# Patient Record
Sex: MALE | Race: WHITE | NOT HISPANIC OR LATINO | Employment: PART TIME | URBAN - NONMETROPOLITAN AREA
[De-identification: names, ages, dates, MRNs, and addresses within clinical notes are randomized per-mention and may not be internally consistent; named-entity substitution may affect disease eponyms.]

---
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
## 2021-06-12 ENCOUNTER — HOSPITAL ENCOUNTER (EMERGENCY)
Facility: HOSPITAL | Age: 21
Discharge: HOME | End: 2021-06-12
Attending: EMERGENCY MEDICINE | Admitting: EMERGENCY MEDICINE

## 2021-06-12 ENCOUNTER — APPOINTMENT (OUTPATIENT)
Dept: CT IMAGING | Facility: HOSPITAL | Age: 21
End: 2021-06-12

## 2021-06-12 VITALS
BODY MASS INDEX: 37.95 KG/M2 | WEIGHT: 280.2 LBS | HEART RATE: 85 BPM | OXYGEN SATURATION: 98 % | RESPIRATION RATE: 16 BRPM | HEIGHT: 72 IN | SYSTOLIC BLOOD PRESSURE: 137 MMHG | DIASTOLIC BLOOD PRESSURE: 85 MMHG | TEMPERATURE: 98.7 F

## 2021-06-12 DIAGNOSIS — K62.5 RECTAL BLEEDING: Primary | ICD-10-CM

## 2021-06-12 LAB
ALBUMIN SERPL-MCNC: 4.5 G/DL (ref 3.5–5.2)
ALBUMIN/GLOB SERPL: 1.7 G/DL
ALP SERPL-CCNC: 92 U/L (ref 39–117)
ALT SERPL W P-5'-P-CCNC: 33 U/L (ref 1–41)
ANION GAP SERPL CALCULATED.3IONS-SCNC: 10 MMOL/L (ref 5–15)
AST SERPL-CCNC: 16 U/L (ref 1–40)
BASOPHILS # BLD AUTO: 0.07 10*3/MM3 (ref 0–0.2)
BASOPHILS NFR BLD AUTO: 0.8 % (ref 0–1.5)
BILIRUB SERPL-MCNC: 0.2 MG/DL (ref 0–1.2)
BUN SERPL-MCNC: 14 MG/DL (ref 6–20)
BUN/CREAT SERPL: 15.4 (ref 7–25)
CALCIUM SPEC-SCNC: 9.2 MG/DL (ref 8.6–10.5)
CHLORIDE SERPL-SCNC: 102 MMOL/L (ref 98–107)
CO2 SERPL-SCNC: 25 MMOL/L (ref 22–29)
CREAT SERPL-MCNC: 0.91 MG/DL (ref 0.76–1.27)
DEPRECATED RDW RBC AUTO: 39.9 FL (ref 37–54)
EOSINOPHIL # BLD AUTO: 0.21 10*3/MM3 (ref 0–0.4)
EOSINOPHIL NFR BLD AUTO: 2.3 % (ref 0.3–6.2)
ERYTHROCYTE [DISTWIDTH] IN BLOOD BY AUTOMATED COUNT: 12 % (ref 12.3–15.4)
GFR SERPL CREATININE-BSD FRML MDRD: 105 ML/MIN/1.73
GLOBULIN UR ELPH-MCNC: 2.6 GM/DL
GLUCOSE SERPL-MCNC: 108 MG/DL (ref 65–99)
HCT VFR BLD AUTO: 43 % (ref 37.5–51)
HGB BLD-MCNC: 14.7 G/DL (ref 13–17.7)
IMM GRANULOCYTES # BLD AUTO: 0.02 10*3/MM3 (ref 0–0.05)
IMM GRANULOCYTES NFR BLD AUTO: 0.2 % (ref 0–0.5)
LIPASE SERPL-CCNC: 32 U/L (ref 13–60)
LYMPHOCYTES # BLD AUTO: 2.46 10*3/MM3 (ref 0.7–3.1)
LYMPHOCYTES NFR BLD AUTO: 27.5 % (ref 19.6–45.3)
MCH RBC QN AUTO: 30.9 PG (ref 26.6–33)
MCHC RBC AUTO-ENTMCNC: 34.2 G/DL (ref 31.5–35.7)
MCV RBC AUTO: 90.3 FL (ref 79–97)
MONOCYTES # BLD AUTO: 0.73 10*3/MM3 (ref 0.1–0.9)
MONOCYTES NFR BLD AUTO: 8.2 % (ref 5–12)
NEUTROPHILS NFR BLD AUTO: 5.45 10*3/MM3 (ref 1.7–7)
NEUTROPHILS NFR BLD AUTO: 61 % (ref 42.7–76)
NRBC BLD AUTO-RTO: 0 /100 WBC (ref 0–0.2)
PLATELET # BLD AUTO: 261 10*3/MM3 (ref 140–450)
PMV BLD AUTO: 10.1 FL (ref 6–12)
POTASSIUM SERPL-SCNC: 3.8 MMOL/L (ref 3.5–5.2)
PROT SERPL-MCNC: 7.1 G/DL (ref 6–8.5)
RBC # BLD AUTO: 4.76 10*6/MM3 (ref 4.14–5.8)
SODIUM SERPL-SCNC: 137 MMOL/L (ref 136–145)
WBC # BLD AUTO: 8.94 10*3/MM3 (ref 3.4–10.8)

## 2021-06-12 PROCEDURE — 74177 CT ABD & PELVIS W/CONTRAST: CPT

## 2021-06-12 PROCEDURE — 25010000002 IOPAMIDOL 61 % SOLUTION: Performed by: EMERGENCY MEDICINE

## 2021-06-12 PROCEDURE — 80053 COMPREHEN METABOLIC PANEL: CPT | Performed by: NURSE PRACTITIONER

## 2021-06-12 PROCEDURE — 85025 COMPLETE CBC W/AUTO DIFF WBC: CPT | Performed by: NURSE PRACTITIONER

## 2021-06-12 PROCEDURE — 83690 ASSAY OF LIPASE: CPT | Performed by: NURSE PRACTITIONER

## 2021-06-12 PROCEDURE — 99283 EMERGENCY DEPT VISIT LOW MDM: CPT

## 2021-06-12 RX ORDER — SODIUM CHLORIDE 0.9 % (FLUSH) 0.9 %
10 SYRINGE (ML) INJECTION AS NEEDED
Status: DISCONTINUED | OUTPATIENT
Start: 2021-06-12 | End: 2021-06-12 | Stop reason: HOSPADM

## 2021-06-12 RX ADMIN — IOPAMIDOL 100 ML: 612 INJECTION, SOLUTION INTRAVENOUS at 19:24

## 2021-06-12 RX ADMIN — SODIUM CHLORIDE 1000 ML: 9 INJECTION, SOLUTION INTRAVENOUS at 18:52

## 2021-06-12 NOTE — ED PROVIDER NOTES
Subjective   History of Present Illness  Is a 21-year-old male who comes in today complaining of bright red rectal bleeding after a bowel movement yesterday.  He states that he continued to have bleeding from his rectum throughout the night last night and slowed down this morning.  He denies any pain but did have some mild cramping.  He denies any burning or itching.  Review of Systems   Constitutional: Negative.    HENT: Negative.    Eyes: Negative.    Respiratory: Negative.    Cardiovascular: Negative.    Gastrointestinal: Positive for anal bleeding.   Genitourinary: Negative.    Skin: Negative.    Neurological: Negative.    Psychiatric/Behavioral: Negative.        Past Medical History:   Diagnosis Date   • Hypoglycemia    • Injury of back     mid to lower back injury   • Lumbosacral disc herniation    • Pneumonia        Allergies   Allergen Reactions   • Bee Venom Anaphylaxis   • Shellfish-Derived Products Anaphylaxis   • Wasp Venom Anaphylaxis       Past Surgical History:   Procedure Laterality Date   • BACK SURGERY     • CHOLECYSTECTOMY     • DENTAL PROCEDURE     • TONSILLECTOMY AND ADENOIDECTOMY         Family History   Problem Relation Age of Onset   • Cancer Father    • No Known Problems Mother        Social History     Socioeconomic History   • Marital status: Single     Spouse name: Not on file   • Number of children: Not on file   • Years of education: Not on file   • Highest education level: Not on file   Tobacco Use   • Smoking status: Never Smoker   • Smokeless tobacco: Never Used   Substance and Sexual Activity   • Alcohol use: No   • Drug use: No   • Sexual activity: Defer           Objective   Physical Exam  Vitals and nursing note reviewed.   Constitutional:       Appearance: Normal appearance. He is obese.   Neurological:      Mental Status: He is alert.     GEN: No acute distress  Head: Normocephalic, atraumatic  Eyes: Pupils equal round reactive to light  ENT: Posterior pharynx normal in  appearance, oral mucosa is moist  Chest: Nontender to palpation  Cardiovascular: Regular rate  Lungs: Clear to auscultation bilaterally  Abdomen: Soft, nontender, nondistended, no peritoneal signs  Rectal exam reveals bright red blood around the anus, no tearing or fissure noted.  No hemorrhoids noted.  Nontender rectal exam  Extremities: No edema, normal appearance  Neuro: GCS 15  Psych: Mood and affect are appropriate      Procedures           ED Course  ED Course as of Jun 12 2047   Sat Jun 12, 2021 2046 I have discussed the findings with the patient. I have advised him to follow up with GI. I have given him strict return to care instructions. He is agreeable to this plan of care.     [TW]      ED Course User Index  [TW] Hiral Emmanuel, APRN                                           MDM  Number of Diagnoses or Management Options     Amount and/or Complexity of Data Reviewed  Clinical lab tests: ordered and reviewed  Tests in the radiology section of CPT®: ordered and reviewed  Review and summarize past medical records: yes  Discuss the patient with other providers: yes    Risk of Complications, Morbidity, and/or Mortality  Presenting problems: moderate  Diagnostic procedures: moderate  Management options: moderate        Final diagnoses:   Rectal bleeding       ED Disposition  ED Disposition     ED Disposition Condition Comment    Discharge Stable           Charu Saucedo, DO  [address] [place] [address]  [phone]          Beatriz Rajput MD  [address] [facility] [address]  [facility] [address]  [facility] [address] [phone]    Schedule an appointment as soon as possible for a visit            Medication List      No changes were made to your prescriptions during this visit.          Hiral Emmanuel, RD  06/12/21 2047

## 2021-06-15 ENCOUNTER — OFFICE VISIT (OUTPATIENT)
Dept: GASTROENTEROLOGY | Facility: CLINIC | Age: 21
End: 2021-06-15

## 2021-06-15 VITALS
BODY MASS INDEX: 38.19 KG/M2 | WEIGHT: 282 LBS | HEIGHT: 72 IN | SYSTOLIC BLOOD PRESSURE: 138 MMHG | DIASTOLIC BLOOD PRESSURE: 77 MMHG | HEART RATE: 79 BPM | TEMPERATURE: 98.2 F | RESPIRATION RATE: 20 BRPM

## 2021-06-15 DIAGNOSIS — K64.8 INTERNAL HEMORRHOIDS: ICD-10-CM

## 2021-06-15 DIAGNOSIS — K92.1 HEMATOCHEZIA: Primary | ICD-10-CM

## 2021-06-15 PROCEDURE — 99203 OFFICE O/P NEW LOW 30 MIN: CPT | Performed by: PHYSICIAN ASSISTANT

## 2021-06-15 RX ORDER — HYDROCORTISONE 25 MG/G
CREAM TOPICAL 2 TIMES DAILY
Qty: 28.35 G | Refills: 2 | Status: SHIPPED | OUTPATIENT
Start: 2021-06-15 | End: 2021-07-27

## 2021-06-15 NOTE — PROGRESS NOTES
New Patient Consult      Date: 06/15/2021   Patient Name: Chary Boykin  MRN: [id number]  : 2000     Primary Care Provider: Charu Saucedo DO  Referring Provider: Maureen    Chief Complaint   Patient presents with   • Rectal Bleeding     History of Present Illness: Chary Boykin is a 21 y.o. male who is here today as a new patient with Gastroenterology for evaluation of rectal bleeding.     Rectal bleeding started 4 days ago and lasted 2 days in duration. He noticed large amount of bright red rectal bleeding into the toilet without any bowel movement with it (showed picture of toilet). Bleeding came first, then his bowel movement which was soft and formed, not hard or loose. He still had bleeding later which was dripping into his clothes without any stool. Denies any abdominal pain, nausea or vomiting associated with the bleeding nor any symptoms present now. He did go to the [facility] ED for evaluation of rectal bleeding but states that nothing was found. He had rectal exam there saying no hemorrhoids seen. He received fluids in the ED. Has not had any rectal pain at all. He has not seen any protrusions from the rectum. No swelling. He has not tried any otc remedies yet, no creams or ointments. Bleeding was not present yesterday or today.     His bowel habits are described as soft, normal and occurring daily without straining. He has never had a colonoscopy. There is no known family history of colon cancer or colon polyps. He has gained 15 lbs recently, no weight loss, no change in appetite. He is very active, working as an EMT. He does not do much heavy lifting. No recent dietary changes. He drinks 4-5 glasses of water daily, reports diet is ok in fiber. Does admit to drinking 1-2 cokes per day and 3 energy drinks per week.     Subjective      Past Medical History:   Diagnosis Date   • Hypoglycemia    • Injury of back     mid to lower back injury   • Lumbosacral disc herniation     • Pneumonia      Past Surgical History:   Procedure Laterality Date   • BACK SURGERY      micro-discectomy   • CHOLECYSTECTOMY      no stones   • TONSILLECTOMY AND ADENOIDECTOMY     • WISDOM TOOTH EXTRACTION       Family History   Problem Relation Age of Onset   • Cancer Father    • No Known Problems Mother    • Colon cancer Neg Hx    • Cirrhosis Neg Hx    • Liver cancer Neg Hx    • Liver disease Neg Hx      Social History     Socioeconomic History   • Marital status: Single     Spouse name: Not on file   • Number of children: Not on file   • Years of education: Not on file   • Highest education level: Not on file   Tobacco Use   • Smoking status: Never Smoker   • Smokeless tobacco: Never Used   Vaping Use   • Vaping Use: Never used   Substance and Sexual Activity   • Alcohol use: Not Currently     Comment: social   • Drug use: Never   • Sexual activity: Defer       Current Outpatient Medications:   •  EPIPEN 2-SOHAIL 0.3 MG/0.3ML solution auto-injector injection, ADM 0.3 MG IM 1 TIME PRF ANAPHYLAXIS, Disp: , Rfl: 0    Allergies   Allergen Reactions   • Bee Venom Anaphylaxis   • Shellfish-Derived Products Anaphylaxis   • Wasp Venom Anaphylaxis     The following portions of the patient's history were reviewed and updated as appropriate: allergies, current medications, past family history, past medical history, past social history, past surgical history and problem list.    Objective     Physical Exam  Vitals reviewed.   Constitutional:       General: He is not in acute distress.     Appearance: Normal appearance. He is well-developed. He is not ill-appearing or diaphoretic.   HENT:      Head: Normocephalic and atraumatic.      Right Ear: External ear normal.      Left Ear: External ear normal.      Nose: Nose normal.      Mouth/Throat:      Comments: Wearing a mask  Eyes:      General: No scleral icterus.        Right eye: No discharge.         Left eye: No discharge.      Conjunctiva/sclera: Conjunctivae normal.  "  Neck:      Vascular: No JVD.   Cardiovascular:      Rate and Rhythm: Normal rate and regular rhythm.      Heart sounds: Normal heart sounds. No murmur heard.   No friction rub. No gallop.    Pulmonary:      Effort: Pulmonary effort is normal. No respiratory distress.      Breath sounds: Normal breath sounds. No wheezing or rales.   Chest:      Chest wall: No tenderness.   Abdominal:      General: Bowel sounds are normal. There is no distension.      Palpations: Abdomen is soft. There is no mass.      Tenderness: There is no abdominal tenderness. There is no guarding.   Musculoskeletal:         General: No deformity. Normal range of motion.      Cervical back: Normal range of motion.   Skin:     General: Skin is warm and dry.      Findings: No erythema or rash.   Neurological:      Mental Status: He is alert and oriented to person, place, and time.      Coordination: Coordination normal.   Psychiatric:         Mood and Affect: Mood normal.         Behavior: Behavior normal.         Thought Content: Thought content normal.         Judgment: Judgment normal.         Vital Signs:   Vitals:    06/15/21 1331   BP: 138/77   Pulse: 79   Resp: 20   Temp: 98.2 °F (36.8 °C)   TempSrc: Comment: infrared   Weight: 128 kg (282 lb)   Height: 182.9 cm (72\")     Results Review:   I have reviewed the patient's new clinical and imaging results.    Admission on 06/12/2021, Discharged on 06/12/2021   Component Date Value Ref Range Status   • Glucose 06/12/2021 108* 65 - 99 mg/dL Final   • BUN 06/12/2021 14  6 - 20 mg/dL Final   • Creatinine 06/12/2021 0.91  0.76 - 1.27 mg/dL Final   • Sodium 06/12/2021 137  136 - 145 mmol/L Final   • Potassium 06/12/2021 3.8  3.5 - 5.2 mmol/L Final   • Chloride 06/12/2021 102  98 - 107 mmol/L Final   • CO2 06/12/2021 25.0  22.0 - 29.0 mmol/L Final   • Calcium 06/12/2021 9.2  8.6 - 10.5 mg/dL Final   • Total Protein 06/12/2021 7.1  6.0 - 8.5 g/dL Final   • Albumin 06/12/2021 4.50  3.50 - 5.20 g/dL " Final   • ALT (SGPT) 06/12/2021 33  1 - 41 U/L Final   • AST (SGOT) 06/12/2021 16  1 - 40 U/L Final   • Alkaline Phosphatase 06/12/2021 92  39 - 117 U/L Final   • Total Bilirubin 06/12/2021 0.2  0.0 - 1.2 mg/dL Final   • eGFR Non African Amer 06/12/2021 105  >60 mL/min/1.73 Final   • Globulin 06/12/2021 2.6  gm/dL Final   • A/G Ratio 06/12/2021 1.7  g/dL Final   • BUN/Creatinine Ratio 06/12/2021 15.4  7.0 - 25.0 Final   • Anion Gap 06/12/2021 10.0  5.0 - 15.0 mmol/L Final   • Lipase 06/12/2021 32  13 - 60 U/L Final   • WBC 06/12/2021 8.94  3.40 - 10.80 10*3/mm3 Final   • RBC 06/12/2021 4.76  4.14 - 5.80 10*6/mm3 Final   • Hemoglobin 06/12/2021 14.7  13.0 - 17.7 g/dL Final   • Hematocrit 06/12/2021 43.0  37.5 - 51.0 % Final   • MCV 06/12/2021 90.3  79.0 - 97.0 fL Final   • MCH 06/12/2021 30.9  26.6 - 33.0 pg Final   • MCHC 06/12/2021 34.2  31.5 - 35.7 g/dL Final   • RDW 06/12/2021 12.0* 12.3 - 15.4 % Final   • RDW-SD 06/12/2021 39.9  37.0 - 54.0 fl Final   • MPV 06/12/2021 10.1  6.0 - 12.0 fL Final   • Platelets 06/12/2021 261  140 - 450 10*3/mm3 Final   • Neutrophil % 06/12/2021 61.0  42.7 - 76.0 % Final   • Lymphocyte % 06/12/2021 27.5  19.6 - 45.3 % Final   • Monocyte % 06/12/2021 8.2  5.0 - 12.0 % Final   • Eosinophil % 06/12/2021 2.3  0.3 - 6.2 % Final   • Basophil % 06/12/2021 0.8  0.0 - 1.5 % Final   • Immature Grans % 06/12/2021 0.2  0.0 - 0.5 % Final   • Neutrophils, Absolute 06/12/2021 5.45  1.70 - 7.00 10*3/mm3 Final   • Lymphocytes, Absolute 06/12/2021 2.46  0.70 - 3.10 10*3/mm3 Final   • Monocytes, Absolute 06/12/2021 0.73  0.10 - 0.90 10*3/mm3 Final   • Eosinophils, Absolute 06/12/2021 0.21  0.00 - 0.40 10*3/mm3 Final   • Basophils, Absolute 06/12/2021 0.07  0.00 - 0.20 10*3/mm3 Final   • Immature Grans, Absolute 06/12/2021 0.02  0.00 - 0.05 10*3/mm3 Final   • nRBC 06/12/2021 0.0  0.0 - 0.2 /100 WBC Final      CT Abdomen Pelvis With Contrast  Result Date: 6/12/2021  No acute findings in the abdomen or  pelvis to account for patient's symptoms.     Assessment / Plan      1. Hematochezia  2. Internal hemorrhoids  6/15/2021  His bright red rectal bleeding lasted 2 days in duration. It occurred without any bowel movement, without straining and without any abdominal or rectal pain. The bleeding continued to drip onto his clothes afterward. It resolved on it's own without any treatment and has not been present for 2 days now. He does not currently have any GI complaints. He has no family history of any GI disease or cancer. He has daily stools without constipation or diarrhea or straining. This bleeding most likely related to internal hemorrhoids. I have asked him to use anusol twice daily for the next 7 days, then only as needed if rectal bleeding reoccurs. He was instructed to increase dietary fiber intake to 25-45g daily and a list of fiber foods was given. He has agreed to try to increase daily water intake and daily exercise as well. He should cut back on caffeine.   CBC normal from recent ED visit as well as CTAP with contrast, reviewed.    If rectal bleeding occurs again, he will call the office and colonoscopy for evaluation will be considered. Without colonoscopy, colon pathology cannot be ruled out, he understands and agrees with this plan.  - Hydrocortisone, Perianal, (ANUSOL-HC) 2.5 % rectal cream; Insert  into the rectum 2 (Two) Times a Day.  Dispense: 28.35 g; Refill: 2      Follow Up:   Return in about 6 weeks (around 7/27/2021) for recheck rectal bleeding.      Felicia Wakefield PA-C  [facility] [place]  6/15/2021  17:05 EDT    Please note that portions of this note may have been completed with a voice recognition program. Efforts were made to edit the dictations, but occasionally words are mistranscribed.

## 2021-06-15 NOTE — PATIENT INSTRUCTIONS
Cut back on caffeine, drink 100 oz water daily    Fiber Foods  It is recommended that you consume 25-45 grams daily.    Fresh & Dried Fruit  Serving Size Fiber (g)    Apples with skin  1 medium 5.0    Apricot  3 medium 1.0    Apricots, dried  4 pieces 2.9    Banana  1 medium 3.9    Blueberries  1 cup 4.2    Cantaloupe, cubes  1 cup 1.3    Figs, dried  2 medium 3.7    Grapefruit  1/2 medium 3.1    Orange, navel  1 medium 3.4    Peach  1 medium 2.0    Peaches, dried  3 pieces 3.2    Pear  1 medium 5.1    Plum  1 medium 1.1    Raisins  1.5 oz box 1.6    Raspberries  1 cup 6.4    Strawberries  1 cup 4.4      Grains, Beans, Nuts & Seeds  Serving Size Fiber (g)    Almonds  1 oz 4.2    Black beans, cooked  1 cup 13.9    Bran cereal  1 cup 19.9    Bread, whole wheat  1 slice 2.0    Brown rice, dry  1 cup 7.9    Cashews  1 oz 1.0    Flax seeds  3 Tbsp. 6.9    Garbanzo beans, cooked  1 cup 5.8    Kidney beans, cooked  1 cup 11.6    Lentils, red cooked  1 cup 13.6    Lima beans, cooked  1 cup 8.6    Oats, rolled dry  1 cup 12.0    Quinoa (seeds) dry  1/4 cup 6.2    Quinoa, cooked  1 cup 8.4    Pasta, whole wheat  1 cup 6.3    Peanuts  1 oz 2.3    Pistachio nuts  1 oz 3.1    Pumpkin seeds  1/4 cup 4.1    Soybeans, cooked  1 cup 8.6    Sunflower seeds  1/4 cup 3.0    Walnuts  1 oz 3.1            Vegetables  Serving Size Fiber (g)    Avocado (fruit)  1 medium 11.8    Beets, cooked  1 cup 2.8    Beet greens  1 cup 4.2    Bok aleyda, cooked  1 cup 2.8    Broccoli, cooked  1 cup 4.5    [place] sprouts, cooked  1 cup 3.6    Cabbage, cooked  1 cup 4.2    Carrot  1 medium 2.6    Carrot, cooked  1 cup 5.2    Cauliflower, cooked  1 cup 3.4    Manuel slaw  1 cup 4.0    [place]aja greens, cooked  1 cup 2.6    Corn, sweet  1 cup 4.6    Green beans  1 cup 4.0    Celery  1 stalk 1.1    Kale, cooked  1 cup 7.2    Onions, raw  1 cup 2.9    Peas, cooked  1 cup 8.8    Peppers, sweet  1 cup 2.6    Pop corn, air-popped  3 cups 3.6    Potato, baked w/  skin  1 medium 4.8    Spinach, cooked  1 cup 4.3    Summer squash, cooked  1 cup 2.5    Sweet potato, cooked  1 medium 4.9    Swiss chard, cooked  1 cup 3.7    Tomato  1 medium 1.0    Winter squash, cooked  1 cup 6.2    Zucchini, cooked  1 cup 2.6

## 2021-06-16 ENCOUNTER — LAB (OUTPATIENT)
Dept: LAB | Facility: HOSPITAL | Age: 21
End: 2021-06-16

## 2021-07-27 ENCOUNTER — OFFICE VISIT (OUTPATIENT)
Dept: GASTROENTEROLOGY | Facility: CLINIC | Age: 21
End: 2021-07-27

## 2021-07-27 VITALS
SYSTOLIC BLOOD PRESSURE: 142 MMHG | WEIGHT: 284 LBS | RESPIRATION RATE: 20 BRPM | HEIGHT: 72 IN | DIASTOLIC BLOOD PRESSURE: 79 MMHG | BODY MASS INDEX: 38.47 KG/M2 | TEMPERATURE: 99.3 F | HEART RATE: 85 BPM

## 2021-07-27 DIAGNOSIS — E66.9 CLASS 2 OBESITY WITH BODY MASS INDEX (BMI) OF 38.0 TO 38.9 IN ADULT, UNSPECIFIED OBESITY TYPE, UNSPECIFIED WHETHER SERIOUS COMORBIDITY PRESENT: ICD-10-CM

## 2021-07-27 DIAGNOSIS — K92.1 HEMATOCHEZIA: Primary | ICD-10-CM

## 2021-07-27 PROCEDURE — 99213 OFFICE O/P EST LOW 20 MIN: CPT | Performed by: PHYSICIAN ASSISTANT

## 2021-07-27 NOTE — PROGRESS NOTES
Follow Up Note     Date: 2021   Patient Name: Chary Boykin  MRN: [id number]  : 2000     Primary Care Provider: Charu Saucedo DO     Chief Complaint   Patient presents with   • Follow-up   • Hematochezia   • Hemorrhoids     History of present illness:   2021  Chary Boykin is a 21 y.o. male who is here today for follow up regarding Hematochezia, and Hemorrhoids.    He is feeling well today without complaints. No more rectal bleeding since incident prior to initial visit. He has been under a lot of stress recently with a new job and moving and has gained almost 60 lbs in the past 3 months. He denies any current abdominal pain, nausea, vomiting, GERD.     Interval History:  6/15/2021  Rectal bleeding started 4 days ago and lasted 2 days in duration. He noticed large amount of bright red rectal bleeding into the toilet without any bowel movement with it (showed picture of toilet). Bleeding came first, then his bowel movement which was soft and formed, not hard or loose. He still had bleeding later which was dripping into his clothes without any stool. Denies any abdominal pain, nausea or vomiting associated with the bleeding nor any symptoms present now. He did go to the [facility] ED for evaluation of rectal bleeding but states that nothing was found. He had rectal exam there saying no hemorrhoids seen. He received fluids in the ED. Has not had any rectal pain at all. He has not seen any protrusions from the rectum. No swelling. He has not tried any otc remedies yet, no creams or ointments. Bleeding was not present yesterday or today.      His bowel habits are described as soft, normal and occurring daily without straining. He has never had a colonoscopy. There is no known family history of colon cancer or colon polyps. He has gained 15 lbs recently, no weight loss, no change in appetite. He is very active, working as an EMT. He does not do much heavy lifting. No recent  dietary changes. He drinks 4-5 glasses of water daily, reports diet is ok in fiber. Does admit to drinking 1-2 cokes per day and 3 energy drinks per week.     Subjective     Past Medical History:   Diagnosis Date   • Hypoglycemia    • Injury of back     mid to lower back injury   • Lumbosacral disc herniation    • Pneumonia      Past Surgical History:   Procedure Laterality Date   • BACK SURGERY      micro-discectomy   • CHOLECYSTECTOMY      no stones   • TONSILLECTOMY AND ADENOIDECTOMY     • WISDOM TOOTH EXTRACTION       Family History   Problem Relation Age of Onset   • Cancer Father    • No Known Problems Mother    • Colon cancer Neg Hx    • Cirrhosis Neg Hx    • Liver cancer Neg Hx    • Liver disease Neg Hx      Social History     Socioeconomic History   • Marital status: Single     Spouse name: Not on file   • Number of children: Not on file   • Years of education: Not on file   • Highest education level: Not on file   Tobacco Use   • Smoking status: Never Smoker   • Smokeless tobacco: Never Used   Vaping Use   • Vaping Use: Never used   Substance and Sexual Activity   • Alcohol use: Not Currently     Comment: social   • Drug use: Never   • Sexual activity: Defer     Current Outpatient Medications:   •  EPIPEN 2-SOHAIL 0.3 MG/0.3ML solution auto-injector injection, ADM 0.3 MG IM 1 TIME PRF ANAPHYLAXIS, Disp: , Rfl: 0    Allergies   Allergen Reactions   • Bee Venom Anaphylaxis   • Shellfish-Derived Products Anaphylaxis   • Wasp Venom Anaphylaxis     The following portions of the patient's history were reviewed and updated as appropriate: allergies, current medications, past family history, past medical history, past social history, past surgical history and problem list.    Objective     Physical Exam  Constitutional:       General: He is not in acute distress.     Appearance: Normal appearance. He is well-developed. He is obese. He is not diaphoretic.   HENT:      Head: Normocephalic and atraumatic.      Right  "Ear: External ear normal.      Left Ear: External ear normal.      Nose: Nose normal.      Mouth/Throat:      Comments: Wearing a mask  Eyes:      General: No scleral icterus.        Right eye: No discharge.         Left eye: No discharge.      Conjunctiva/sclera: Conjunctivae normal.   Neck:      Trachea: No tracheal deviation.   Pulmonary:      Effort: Pulmonary effort is normal. No respiratory distress.   Musculoskeletal:         General: Normal range of motion.      Cervical back: Normal range of motion.   Skin:     Coloration: Skin is not pale.      Findings: No erythema or rash.   Neurological:      Mental Status: He is alert and oriented to person, place, and time.      Coordination: Coordination normal.   Psychiatric:         Mood and Affect: Mood normal.         Behavior: Behavior normal.         Thought Content: Thought content normal.         Judgment: Judgment normal.       Vitals:    07/27/21 1428   BP: 142/79   Pulse: 85   Resp: 20   Temp: 99.3 °F (37.4 °C)   TempSrc: Infrared   Weight: 129 kg (284 lb)   Height: 182.9 cm (72\")       Results Review:   I reviewed the patient's new clinical results.    Admission on 06/12/2021, Discharged on 06/12/2021   Component Date Value Ref Range Status   • Glucose 06/12/2021 108* 65 - 99 mg/dL Final   • BUN 06/12/2021 14  6 - 20 mg/dL Final   • Creatinine 06/12/2021 0.91  0.76 - 1.27 mg/dL Final   • Sodium 06/12/2021 137  136 - 145 mmol/L Final   • Potassium 06/12/2021 3.8  3.5 - 5.2 mmol/L Final   • Chloride 06/12/2021 102  98 - 107 mmol/L Final   • CO2 06/12/2021 25.0  22.0 - 29.0 mmol/L Final   • Calcium 06/12/2021 9.2  8.6 - 10.5 mg/dL Final   • Total Protein 06/12/2021 7.1  6.0 - 8.5 g/dL Final   • Albumin 06/12/2021 4.50  3.50 - 5.20 g/dL Final   • ALT (SGPT) 06/12/2021 33  1 - 41 U/L Final   • AST (SGOT) 06/12/2021 16  1 - 40 U/L Final   • Alkaline Phosphatase 06/12/2021 92  39 - 117 U/L Final   • Total Bilirubin 06/12/2021 0.2  0.0 - 1.2 mg/dL Final   • eGFR " Non  Amer 06/12/2021 105  >60 mL/min/1.73 Final   • Globulin 06/12/2021 2.6  gm/dL Final   • A/G Ratio 06/12/2021 1.7  g/dL Final   • BUN/Creatinine Ratio 06/12/2021 15.4  7.0 - 25.0 Final   • Anion Gap 06/12/2021 10.0  5.0 - 15.0 mmol/L Final   • Lipase 06/12/2021 32  13 - 60 U/L Final   • WBC 06/12/2021 8.94  3.40 - 10.80 10*3/mm3 Final   • RBC 06/12/2021 4.76  4.14 - 5.80 10*6/mm3 Final   • Hemoglobin 06/12/2021 14.7  13.0 - 17.7 g/dL Final   • Hematocrit 06/12/2021 43.0  37.5 - 51.0 % Final   • MCV 06/12/2021 90.3  79.0 - 97.0 fL Final   • MCH 06/12/2021 30.9  26.6 - 33.0 pg Final   • MCHC 06/12/2021 34.2  31.5 - 35.7 g/dL Final   • RDW 06/12/2021 12.0* 12.3 - 15.4 % Final   • RDW-SD 06/12/2021 39.9  37.0 - 54.0 fl Final   • MPV 06/12/2021 10.1  6.0 - 12.0 fL Final   • Platelets 06/12/2021 261  140 - 450 10*3/mm3 Final   • Neutrophil % 06/12/2021 61.0  42.7 - 76.0 % Final   • Lymphocyte % 06/12/2021 27.5  19.6 - 45.3 % Final   • Monocyte % 06/12/2021 8.2  5.0 - 12.0 % Final   • Eosinophil % 06/12/2021 2.3  0.3 - 6.2 % Final   • Basophil % 06/12/2021 0.8  0.0 - 1.5 % Final   • Immature Grans % 06/12/2021 0.2  0.0 - 0.5 % Final   • Neutrophils, Absolute 06/12/2021 5.45  1.70 - 7.00 10*3/mm3 Final   • Lymphocytes, Absolute 06/12/2021 2.46  0.70 - 3.10 10*3/mm3 Final   • Monocytes, Absolute 06/12/2021 0.73  0.10 - 0.90 10*3/mm3 Final   • Eosinophils, Absolute 06/12/2021 0.21  0.00 - 0.40 10*3/mm3 Final   • Basophils, Absolute 06/12/2021 0.07  0.00 - 0.20 10*3/mm3 Final   • Immature Grans, Absolute 06/12/2021 0.02  0.00 - 0.05 10*3/mm3 Final   • nRBC 06/12/2021 0.0  0.0 - 0.2 /100 WBC Final      CT Abdomen Pelvis With Contrast  Result Date: 6/12/2021  No acute findings in the abdomen or pelvis to account for patient's symptoms.     Assessment / Plan      1. Hematochezia  07/27/21  No further bleeding noted. He is feeling well. Does not want to have further evaluation at this time. Call back if rectal bleeding  reoccurs and colonoscopy will be arranged.    6/15/2021  His bright red rectal bleeding lasted 2 days in duration. It occurred without any bowel movement, without straining and without any abdominal or rectal pain. The bleeding continued to drip onto his clothes afterward. It resolved on it's own without any treatment and has not been present for 2 days now. He does not currently have any GI complaints. He has no family history of any GI disease or cancer. He has daily stools without constipation or diarrhea or straining. This bleeding most likely related to internal hemorrhoids. I have asked him to use anusol twice daily for the next 7 days, then only as needed if rectal bleeding reoccurs. He was instructed to increase dietary fiber intake to 25-45g daily and a list of fiber foods was given. He has agreed to try to increase daily water intake and daily exercise as well. He should cut back on caffeine.   CBC normal from recent ED visit as well as CTAP with contrast, reviewed.    If rectal bleeding occurs again, he will call the office and colonoscopy for evaluation will be considered. Without colonoscopy, colon pathology cannot be ruled out, he understands and agrees with this plan.    2. Class 2 obesity with body mass index (BMI) of 38.0 to 38.9 in adult, unspecified obesity type, unspecified whether serious comorbidity present  7/27/2021  BMI is increased at 38. He does need to work on losing weight. Mediterranean diet recommended. He is at risk for development of fatty liver disease. No liver disease noted on recent CTAP and liver enzymes normal.             Follow Up:   Return if symptoms worsen or fail to improve.      Felicia Wakefield PA-C  [facility]  7/27/2021  15:49 EDT    Please note that portions of this note may have been completed with a voice recognition program. Efforts were made to edit the dictations, but occasionally words are mistranscribed.

## 2021-07-27 NOTE — PATIENT INSTRUCTIONS
Mediterranean Diet  A Mediterranean diet refers to food and lifestyle choices that are based on the traditions of countries located on the [place]. This way of eating has been shown to help prevent certain conditions and improve outcomes for people who have chronic diseases, like kidney disease and heart disease.  What are tips for following this plan?  Lifestyle  · Cook and eat meals together with your family, when possible.  · Drink enough fluid to keep your urine clear or pale yellow.  · Be physically active every day. This includes:  ? Aerobic exercise like running or swimming.  ? Leisure activities like gardening, walking, or housework.  · Get 7-8 hours of sleep each night.  · If recommended by your health care provider, drink red wine in moderation. This means 1 glass a day for nonpregnant women and 2 glasses a day for men. A glass of wine equals 5 oz (150 mL).  Reading food labels    · Check the serving size of packaged foods. For foods such as rice and pasta, the serving size refers to the amount of cooked product, not dry.  · Check the total fat in packaged foods. Avoid foods that have saturated fat or trans fats.  · Check the ingredients list for added sugars, such as corn syrup.  Shopping  · At the grocery store, buy most of your food from the areas near the walls of the store. This includes:  ? Fresh fruits and vegetables (produce).  ? Grains, beans, nuts, and seeds. Some of these may be available in unpackaged forms or large amounts (in bulk).  ? Fresh seafood.  ? Poultry and eggs.  ? Low-fat dairy products.  · Buy whole ingredients instead of prepackaged foods.  · Buy fresh fruits and vegetables in-season from local farmers markets.  · Buy frozen fruits and vegetables in resealable bags.  · If you do not have access to quality fresh seafood, buy precooked frozen shrimp or canned fish, such as tuna, salmon, or sardines.  · Buy small amounts of raw or cooked vegetables, salads, or olives from  the deli or salad bar at your store.  · Stock your pantry so you always have certain foods on hand, such as olive oil, canned tuna, canned tomatoes, rice, pasta, and beans.  Cooking  · Cook foods with extra-virgin olive oil instead of using butter or other vegetable oils.  · Have meat as a side dish, and have vegetables or grains as your main dish. This means having meat in small portions or adding small amounts of meat to foods like pasta or stew.  · Use beans or vegetables instead of meat in common dishes like chili or lasagna.  · [place] with different cooking methods. Try roasting or broiling vegetables instead of steaming or sautéeing them.  · Add frozen vegetables to soups, stews, pasta, or rice.  · Add nuts or seeds for added healthy fat at each meal. You can add these to yogurt, salads, or vegetable dishes.  · Marinate fish or vegetables using olive oil, lemon juice, garlic, and fresh herbs.  Meal planning    · Plan to eat 1 vegetarian meal one day each week. Try to work up to 2 vegetarian meals, if possible.  · Eat seafood 2 or more times a week.  · Have healthy snacks readily available, such as:  ? Vegetable sticks with hummus.  ? Greek yogurt.  ? Fruit and nut trail mix.  · Eat balanced meals throughout the week. This includes:  ? Fruit: 2-3 servings a day  ? Vegetables: 4-5 servings a day  ? Low-fat dairy: 2 servings a day  ? Fish, poultry, or lean meat: 1 serving a day  ? Beans and legumes: 2 or more servings a week  ? Nuts and seeds: 1-2 servings a day  ? Whole grains: 6-8 servings a day  ? Extra-virgin olive oil: 3-4 servings a day  · Limit red meat and sweets to only a few servings a month  What are my food choices?  · Mediterranean diet  ? Recommended  § Grains: Whole-grain pasta. Brown rice. Bulgar wheat. Polenta. Couscous. Whole-wheat bread. Oatmeal. Quinoa.  § Vegetables: Artichokes. Beets. Broccoli. Cabbage. Carrots. Eggplant. Green beans. Chard. Kale. Spinach. Onions. Leeks. Peas. Squash.  Tomatoes. Peppers. Radishes.  § Fruits: Apples. Apricots. Avocado. Berries. Bananas. Cherries. Dates. Figs. Grapes. Joseph. Melon. Oranges. Peaches. Plums. Pomegranate.  § Meats and other protein foods: Beans. Almonds. Sunflower seeds. Pine nuts. Peanuts. Cod. [place]. Scallops. Shrimp. Tuna. Tilapia. Clams. Oysters. Eggs.  § Dairy: Low-fat milk. Cheese. Greek yogurt.  § Beverages: Water. Red wine. Herbal tea.  § Fats and oils: Extra virgin olive oil. Avocado oil. Grape seed oil.  § Sweets and desserts: Greek yogurt with honey. Baked apples. Poached pears. Trail mix.  § Seasoning and other foods: Basil. Cilantro. Coriander. Cumin. Mint. Parsley. Meet. Rosemary. Tarragon. Garlic. Oregano. Thyme. Pepper. Balsalmic vinegar. Tahini. Hummus. Tomato sauce. Olives. Mushrooms.  ? Limit these  § Grains: Prepackaged pasta or rice dishes. Prepackaged cereal with added sugar.  § Vegetables: Deep fried potatoes (french fries).  § Fruits: Fruit canned in syrup.  § Meats and other protein foods: Beef. Pork. Lamb. Poultry with skin. Hot dogs. Sands.  § Dairy: Ice cream. Sour cream. Whole milk.  § Beverages: Juice. Sugar-sweetened soft drinks. Beer. Liquor and spirits.  § Fats and oils: Butter. Canola oil. Vegetable oil. Beef fat (tallow). Lard.  § Sweets and desserts: Cookies. Cakes. Pies. Candy.  § Seasoning and other foods: Mayonnaise. Premade sauces and marinades.  The items listed may not be a complete list. Talk with your dietitian about what dietary choices are right for you.  Summary  · The Mediterranean diet includes both food and lifestyle choices.  · Eat a variety of fresh fruits and vegetables, beans, nuts, seeds, and whole grains.  · Limit the amount of red meat and sweets that you eat.  · Talk with your health care provider about whether it is safe for you to drink red wine in moderation. This means 1 glass a day for nonpregnant women and 2 glasses a day for men. A glass of wine equals 5 oz (150 mL).  This information  is not intended to replace advice given to you by your health care provider. Make sure you discuss any questions you have with your health care provider.  Document Revised: 08/17/2017 Document Reviewed: 08/10/2017  Elsevier Patient Education © 2020 [employer]

## 2022-11-29 RX ORDER — METHYLPREDNISOLONE 4 MG/1
TABLET ORAL
Qty: 21 TABLET | Refills: 0 | Status: SHIPPED | OUTPATIENT
Start: 2022-11-29 | End: 2023-01-23

## 2022-11-29 RX ORDER — METHOCARBAMOL 750 MG/1
750 TABLET, FILM COATED ORAL 3 TIMES DAILY PRN
Qty: 30 TABLET | Refills: 0 | Status: SHIPPED | OUTPATIENT
Start: 2022-11-29

## 2023-01-23 ENCOUNTER — OFFICE VISIT (OUTPATIENT)
Dept: INTERNAL MEDICINE | Facility: CLINIC | Age: 23
End: 2023-01-23
Payer: COMMERCIAL

## 2023-01-23 VITALS
BODY MASS INDEX: 37.65 KG/M2 | RESPIRATION RATE: 17 BRPM | HEART RATE: 76 BPM | OXYGEN SATURATION: 99 % | TEMPERATURE: 97.2 F | WEIGHT: 278 LBS | HEIGHT: 72 IN | SYSTOLIC BLOOD PRESSURE: 136 MMHG | DIASTOLIC BLOOD PRESSURE: 76 MMHG

## 2023-01-23 DIAGNOSIS — G89.29 CHRONIC PAIN OF RIGHT KNEE: ICD-10-CM

## 2023-01-23 DIAGNOSIS — E66.01 CLASS 2 SEVERE OBESITY WITH SERIOUS COMORBIDITY AND BODY MASS INDEX (BMI) OF 37.0 TO 37.9 IN ADULT, UNSPECIFIED OBESITY TYPE: ICD-10-CM

## 2023-01-23 DIAGNOSIS — M25.561 CHRONIC PAIN OF RIGHT KNEE: ICD-10-CM

## 2023-01-23 DIAGNOSIS — Z00.00 ANNUAL PHYSICAL EXAM: ICD-10-CM

## 2023-01-23 DIAGNOSIS — M54.41 LOW BACK PAIN WITH RIGHT-SIDED SCIATICA, UNSPECIFIED BACK PAIN LATERALITY, UNSPECIFIED CHRONICITY: ICD-10-CM

## 2023-01-23 DIAGNOSIS — T78.40XA ALLERGY, INITIAL ENCOUNTER: ICD-10-CM

## 2023-01-23 DIAGNOSIS — F41.9 ANXIETY: Primary | ICD-10-CM

## 2023-01-23 PROBLEM — E66.812 CLASS 2 SEVERE OBESITY WITH SERIOUS COMORBIDITY AND BODY MASS INDEX (BMI) OF 37.0 TO 37.9 IN ADULT: Status: ACTIVE | Noted: 2023-01-23

## 2023-01-23 PROCEDURE — 99213 OFFICE O/P EST LOW 20 MIN: CPT | Performed by: INTERNAL MEDICINE

## 2023-01-23 PROCEDURE — 99395 PREV VISIT EST AGE 18-39: CPT | Performed by: INTERNAL MEDICINE

## 2023-01-23 RX ORDER — BUSPIRONE HYDROCHLORIDE 5 MG/1
TABLET ORAL 2 TIMES DAILY
COMMUNITY
Start: 2023-01-01

## 2023-01-23 NOTE — PROGRESS NOTES
Subjective   Chary Boykin is a 22 y.o. male and is here for a comprehensive physical exam.     Do you take any herbs or supplements that were not prescribed by a doctor? no  Are you taking calcium supplements? no  Are you taking aspirin daily? no      The following portions of the patient's history were reviewed and updated as appropriate: allergies, current medications, past family history, past medical history, past social history, past surgical history and problem list.    Patient Active Problem List   Diagnosis   • Anxiety   • Low back pain with right-sided sciatica   • Allergy   • Chronic pain of right knee   • Class 2 severe obesity with serious comorbidity and body mass index (BMI) of 37.0 to 37.9 in adult (HCC)       Review of Systems   Constitutional: Negative.    HENT: Negative.    Eyes: Negative.    Respiratory: Negative.    Cardiovascular: Negative.    Gastrointestinal: Negative.    Endocrine: Negative.    Genitourinary: Negative.    Musculoskeletal: Positive for arthralgias.   Skin: Negative.    Allergic/Immunologic: Negative.    Neurological: Negative.    Hematological: Negative.    Psychiatric/Behavioral: Negative.    All other systems reviewed and are negative.      Physical Exam  Constitutional:       Appearance: Normal appearance. He is well-developed. He is obese.   HENT:      Head: Normocephalic.      Right Ear: Tympanic membrane, ear canal and external ear normal.      Left Ear: Tympanic membrane, ear canal and external ear normal.      Nose: Nose normal.      Mouth/Throat:      Mouth: Mucous membranes are moist.      Pharynx: Oropharynx is clear.   Eyes:      Extraocular Movements: Extraocular movements intact.      Conjunctiva/sclera: Conjunctivae normal.      Pupils: Pupils are equal, round, and reactive to light.   Cardiovascular:      Rate and Rhythm: Normal rate and regular rhythm.      Heart sounds: Normal heart sounds.   Pulmonary:      Effort: Pulmonary effort is normal.       Breath sounds: Normal breath sounds.   Abdominal:      General: Bowel sounds are normal.      Palpations: Abdomen is soft.   Musculoskeletal:         General: Normal range of motion.      Cervical back: Normal range of motion and neck supple.   Skin:     General: Skin is warm.   Neurological:      Mental Status: He is alert and oriented to person, place, and time.   Psychiatric:         Mood and Affect: Mood normal.         Behavior: Behavior normal.         Thought Content: Thought content normal.         Judgment: Judgment normal.         All  tests have been reviewed.    Assessment & Plan          1. Patient Counseling:  --Nutrition: Stressed importance of moderation in sodium/caffeine intake, saturated fat and cholesterol, caloric balance, sufficient intake of fresh fruits, vegetables, fiber, calcium and iron.  --Exercise: Stressed the importance of regular exercise.   --Injury prevention: Discussed safety belts, safety helmets, smoke detector, smoking near bedding or upholstery.   --Dental health: Discussed importance of regular tooth brushing, flossing, and dental visits.  --Immunizations reviewed.  --Discussed benefits of screening colonoscopy.  --After hours service discussed with patient    2. Discussed the patient's BMI with him.    Body mass index is 37.7 kg/m².  Class 2 Severe Obesity (BMI >=35 and <=39.9). Obesity-related health conditions include the following: osteoarthritis. Obesity is improving with treatment. BMI is is above average; BMI management plan is completed. We discussed portion control and increasing exercise.

## 2023-01-23 NOTE — PROGRESS NOTES
Subjective   Chary Boykin is a 22 y.o. male.     Chief Complaint   Patient presents with   • Establish Care   • Annual Exam   • Knee Pain     Right sided-with movement makes noise, but is now having more pain   • Anxiety     On Buspar and is working well-hoping we will prescribe, when due again       History of Present Illness   Patient here to establish care and also physical.  Anxiety improved on medication patient wants to have medication prescribed in this office.  Also complains right knee pain chronic also making noises when certain movement.  Pain for some time.  Patient is obese BMI 37.  Also has a history of allergy.  Patient is taking EpiPen as needed.  Low back pain comes and goes Robaxin helps.    Current Outpatient Medications:   •  busPIRone (BUSPAR) 5 MG tablet, 2 (Two) Times a Day., Disp: , Rfl:   •  EPIPEN 2-SOHAIL 0.3 MG/0.3ML solution auto-injector injection, ADM 0.3 MG IM 1 TIME PRF ANAPHYLAXIS, Disp: , Rfl: 0  •  methocarbamol (ROBAXIN) 750 MG tablet, Take 1 tablet by mouth 3 (Three) Times a Day As Needed for Muscle Spasms., Disp: 30 tablet, Rfl: 0    The following portions of the patient's history were reviewed and updated as appropriate: allergies, current medications, past family history, past medical history, past social history, past surgical history and problem list.    Review of Systems   Constitutional: Negative.    Respiratory: Negative.    Cardiovascular: Negative.    Gastrointestinal: Negative.    Musculoskeletal: Positive for arthralgias.   Skin: Negative.    Neurological: Negative.    Psychiatric/Behavioral: The patient is nervous/anxious.        Objective   Physical Exam  Constitutional:       Appearance: Normal appearance.   Cardiovascular:      Rate and Rhythm: Normal rate and regular rhythm.      Heart sounds: Normal heart sounds.   Pulmonary:      Effort: Pulmonary effort is normal.      Breath sounds: Normal breath sounds.   Abdominal:      General: Bowel sounds are  normal.   Musculoskeletal:      Cervical back: Neck supple.   Skin:     General: Skin is warm.   Neurological:      Mental Status: He is alert and oriented to person, place, and time.         All tests have been reviewed.    Assessment & Plan   Diagnoses and all orders for this visit:    Anxiety stable on BuSpar 5 mg twice a day continue medication    Low back pain with right-sided sciatica, unspecified back pain laterality, unspecified chronicity Robaxin helps patient is using it as needed continue medication    Allergy, initial encounter EpiPen as needed    Chronic pain of right knee encourage patient not to have high impact exercise.  Over-the-counter as needed medication.    Class 2 severe obesity with serious comorbidity and body mass index (BMI) of 37.0 to 37.9 in adult, unspecified obesity type (HCC) counseling for good diet weight loss    Annual physical exam  -     CBC & Differential  -     Comprehensive Metabolic Panel  -     Lipid Panel  -     TSH  -     Hepatitis C Antibody    Other orders  -     busPIRone (BUSPAR) 5 MG tablet; 2 (Two) Times a Day.    Do blood tests now.    6 mo

## 2023-04-14 RX ORDER — METHOCARBAMOL 750 MG/1
750 TABLET, FILM COATED ORAL 3 TIMES DAILY PRN
Qty: 30 TABLET | Refills: 0 | Status: SHIPPED | OUTPATIENT
Start: 2023-04-14

## 2023-04-18 RX ORDER — METHOCARBAMOL 750 MG/1
750 TABLET, FILM COATED ORAL 3 TIMES DAILY PRN
Qty: 30 TABLET | Refills: 0 | OUTPATIENT
Start: 2023-04-18

## 2023-04-18 RX ORDER — BUSPIRONE HYDROCHLORIDE 5 MG/1
5 TABLET ORAL 2 TIMES DAILY
Qty: 60 TABLET | Refills: 0 | Status: SHIPPED | OUTPATIENT
Start: 2023-04-18

## 2023-05-17 RX ORDER — BUSPIRONE HYDROCHLORIDE 5 MG/1
TABLET ORAL
Qty: 60 TABLET | Refills: 0 | Status: SHIPPED | OUTPATIENT
Start: 2023-05-17

## 2023-05-17 NOTE — TELEPHONE ENCOUNTER
Rx Refill Note  Requested Prescriptions     Pending Prescriptions Disp Refills    busPIRone (BUSPAR) 5 MG tablet [Pharmacy Med Name: BUSPIRONE 5MG TABLETS] 60 tablet 0     Sig: TAKE 1 TABLET BY MOUTH TWICE DAILY      Last office visit with prescribing clinician: 1/23/2023   Last telemedicine visit with prescribing clinician: 4/14/2023   Next office visit with prescribing clinician: 7/24/2023                         Would you like a call back once the refill request has been completed: [] Yes [] No    If the office needs to give you a call back, can they leave a voicemail: [] Yes [] No    Chance Hale Rep  05/17/23, 10:50 EDT

## 2023-07-24 ENCOUNTER — OFFICE VISIT (OUTPATIENT)
Dept: INTERNAL MEDICINE | Facility: CLINIC | Age: 23
End: 2023-07-24
Payer: COMMERCIAL

## 2023-07-24 VITALS
HEART RATE: 77 BPM | SYSTOLIC BLOOD PRESSURE: 130 MMHG | DIASTOLIC BLOOD PRESSURE: 76 MMHG | TEMPERATURE: 97.6 F | WEIGHT: 260 LBS | OXYGEN SATURATION: 98 % | BODY MASS INDEX: 35.21 KG/M2 | HEIGHT: 72 IN

## 2023-07-24 DIAGNOSIS — G89.29 CHRONIC PAIN OF RIGHT KNEE: ICD-10-CM

## 2023-07-24 DIAGNOSIS — E66.01 CLASS 2 SEVERE OBESITY DUE TO EXCESS CALORIES WITH SERIOUS COMORBIDITY AND BODY MASS INDEX (BMI) OF 37.0 TO 37.9 IN ADULT: Primary | ICD-10-CM

## 2023-07-24 DIAGNOSIS — F41.9 ANXIETY: ICD-10-CM

## 2023-07-24 DIAGNOSIS — M25.561 CHRONIC PAIN OF RIGHT KNEE: ICD-10-CM

## 2023-07-24 DIAGNOSIS — M54.41 LOW BACK PAIN WITH RIGHT-SIDED SCIATICA, UNSPECIFIED BACK PAIN LATERALITY, UNSPECIFIED CHRONICITY: ICD-10-CM

## 2023-07-24 PROCEDURE — 99214 OFFICE O/P EST MOD 30 MIN: CPT | Performed by: INTERNAL MEDICINE

## 2023-07-24 RX ORDER — NALOXONE HYDROCHLORIDE 4 MG/.1ML
1 SPRAY NASAL AS NEEDED
COMMUNITY
Start: 2023-03-24 | End: 2024-03-23

## 2023-07-24 RX ORDER — METHOCARBAMOL 750 MG/1
750 TABLET, FILM COATED ORAL 3 TIMES DAILY PRN
Qty: 30 TABLET | Refills: 1 | Status: SHIPPED | OUTPATIENT
Start: 2023-07-24

## 2023-07-24 NOTE — PROGRESS NOTES
Subjective   Chary Boykin is a 23 y.o. male.     Chief Complaint   Patient presents with    Anxiety    Obesity       History of Present Illness   Patient here for follow-up.  Recent blood test in the hospital showed elevated white blood cell decreased red blood cell and elevated liver enzyme.  At that time patient had work-related injury.  Low back pain stable on muscle relaxant as needed use.  Anxiety resolved without medication.  Knee joint pain resolved.  Weight loss on purpose.    Current Outpatient Medications:     EPIPEN 2-SOHAIL 0.3 MG/0.3ML solution auto-injector injection, ADM 0.3 MG IM 1 TIME PRF ANAPHYLAXIS, Disp: , Rfl: 0    methocarbamol (ROBAXIN) 750 MG tablet, Take 1 tablet by mouth 3 (Three) Times a Day As Needed for Muscle Spasms., Disp: 30 tablet, Rfl: 0    naloxone (NARCAN) 4 MG/0.1ML nasal spray, 1 spray into the nostril(s) as directed by provider As Needed., Disp: , Rfl:     busPIRone (BUSPAR) 5 MG tablet, TAKE 1 TABLET BY MOUTH TWICE DAILY, Disp: 60 tablet, Rfl: 0    The following portions of the patient's history were reviewed and updated as appropriate: allergies, current medications, past family history, past medical history, past social history, past surgical history, and problem list.    Review of Systems   Constitutional: Negative.    Respiratory: Negative.     Cardiovascular: Negative.    Gastrointestinal: Negative.    Musculoskeletal: Negative.    Skin: Negative.    Neurological: Negative.    Psychiatric/Behavioral: Negative.       Objective   Physical Exam  Cardiovascular:      Rate and Rhythm: Normal rate and regular rhythm.      Heart sounds: Normal heart sounds.   Pulmonary:      Effort: Pulmonary effort is normal.      Breath sounds: Normal breath sounds.   Abdominal:      General: Bowel sounds are normal.   Musculoskeletal:      Cervical back: Neck supple.   Skin:     General: Skin is warm.   Neurological:      Mental Status: He is alert and oriented to person, place, and  time.       All tests have been reviewed.    Assessment & Plan   There are no diagnoses linked to this encounter.      Anxiety stable without BuSpar 5 mg twice a day self d/c'd      Low back pain stable on Robaxin prn      Chronic pain of right knee resolved     Class 2 severe obesity weight loss on purpose      Anemia, repeat     Abnl liver enz        Do blood tests now.     6 mo physical   Answers submitted by the patient for this visit:  Primary Reason for Visit (Submitted on 7/24/2023)  What is the primary reason for your visit?: Other  Other (Submitted on 7/24/2023)  Please describe your symptoms.: Follow up  Have you had these symptoms before?: No  How long have you been having these symptoms?: Greater than 2 weeks  Please list any medications you are currently taking for this condition.: N/A

## 2023-07-25 LAB
ALBUMIN SERPL-MCNC: 4.8 G/DL (ref 3.5–5.2)
ALBUMIN/GLOB SERPL: 2.4 G/DL
ALP SERPL-CCNC: 91 U/L (ref 39–117)
ALT SERPL-CCNC: 24 U/L (ref 1–41)
AST SERPL-CCNC: 12 U/L (ref 1–40)
BASOPHILS # BLD AUTO: 0.07 10*3/MM3 (ref 0–0.2)
BASOPHILS NFR BLD AUTO: 1.1 % (ref 0–1.5)
BILIRUB SERPL-MCNC: 0.4 MG/DL (ref 0–1.2)
BUN SERPL-MCNC: 13 MG/DL (ref 6–20)
BUN/CREAT SERPL: 14.4 (ref 7–25)
CALCIUM SERPL-MCNC: 9.6 MG/DL (ref 8.6–10.5)
CHLORIDE SERPL-SCNC: 105 MMOL/L (ref 98–107)
CHOLEST SERPL-MCNC: 128 MG/DL (ref 0–200)
CO2 SERPL-SCNC: 27 MMOL/L (ref 22–29)
CREAT SERPL-MCNC: 0.9 MG/DL (ref 0.76–1.27)
EGFRCR SERPLBLD CKD-EPI 2021: 123.1 ML/MIN/1.73
EOSINOPHIL # BLD AUTO: 0.21 10*3/MM3 (ref 0–0.4)
EOSINOPHIL NFR BLD AUTO: 3.2 % (ref 0.3–6.2)
ERYTHROCYTE [DISTWIDTH] IN BLOOD BY AUTOMATED COUNT: 12.6 % (ref 12.3–15.4)
GLOBULIN SER CALC-MCNC: 2 GM/DL
GLUCOSE SERPL-MCNC: 91 MG/DL (ref 65–99)
HCT VFR BLD AUTO: 43.4 % (ref 37.5–51)
HCV IGG SERPL QL IA: NON REACTIVE
HDLC SERPL-MCNC: 42 MG/DL (ref 40–60)
HGB BLD-MCNC: 14.6 G/DL (ref 13–17.7)
IMM GRANULOCYTES # BLD AUTO: 0.02 10*3/MM3 (ref 0–0.05)
IMM GRANULOCYTES NFR BLD AUTO: 0.3 % (ref 0–0.5)
LDLC SERPL CALC-MCNC: 73 MG/DL (ref 0–100)
LYMPHOCYTES # BLD AUTO: 1.77 10*3/MM3 (ref 0.7–3.1)
LYMPHOCYTES NFR BLD AUTO: 27.1 % (ref 19.6–45.3)
MCH RBC QN AUTO: 30.4 PG (ref 26.6–33)
MCHC RBC AUTO-ENTMCNC: 33.6 G/DL (ref 31.5–35.7)
MCV RBC AUTO: 90.4 FL (ref 79–97)
MONOCYTES # BLD AUTO: 0.51 10*3/MM3 (ref 0.1–0.9)
MONOCYTES NFR BLD AUTO: 7.8 % (ref 5–12)
NEUTROPHILS # BLD AUTO: 3.96 10*3/MM3 (ref 1.7–7)
NEUTROPHILS NFR BLD AUTO: 60.5 % (ref 42.7–76)
NRBC BLD AUTO-RTO: 0 /100 WBC (ref 0–0.2)
PLATELET # BLD AUTO: 271 10*3/MM3 (ref 140–450)
POTASSIUM SERPL-SCNC: 4.9 MMOL/L (ref 3.5–5.2)
PROT SERPL-MCNC: 6.8 G/DL (ref 6–8.5)
RBC # BLD AUTO: 4.8 10*6/MM3 (ref 4.14–5.8)
SODIUM SERPL-SCNC: 141 MMOL/L (ref 136–145)
TRIGL SERPL-MCNC: 64 MG/DL (ref 0–150)
TSH SERPL DL<=0.005 MIU/L-ACNC: 2.16 UIU/ML (ref 0.27–4.2)
VLDLC SERPL CALC-MCNC: 13 MG/DL (ref 5–40)
WBC # BLD AUTO: 6.54 10*3/MM3 (ref 3.4–10.8)

## 2024-02-26 ENCOUNTER — OFFICE VISIT (OUTPATIENT)
Dept: INTERNAL MEDICINE | Facility: CLINIC | Age: 24
End: 2024-02-26
Payer: COMMERCIAL

## 2024-02-26 VITALS
RESPIRATION RATE: 16 BRPM | BODY MASS INDEX: 37.38 KG/M2 | OXYGEN SATURATION: 98 % | SYSTOLIC BLOOD PRESSURE: 136 MMHG | WEIGHT: 276 LBS | DIASTOLIC BLOOD PRESSURE: 68 MMHG | HEIGHT: 72 IN | HEART RATE: 77 BPM

## 2024-02-26 DIAGNOSIS — E66.01 CLASS 2 SEVERE OBESITY WITH SERIOUS COMORBIDITY AND BODY MASS INDEX (BMI) OF 37.0 TO 37.9 IN ADULT, UNSPECIFIED OBESITY TYPE: Primary | ICD-10-CM

## 2024-02-26 DIAGNOSIS — M54.41 LOW BACK PAIN WITH RIGHT-SIDED SCIATICA, UNSPECIFIED BACK PAIN LATERALITY, UNSPECIFIED CHRONICITY: ICD-10-CM

## 2024-02-26 DIAGNOSIS — Z00.00 ENCOUNTER FOR PREVENTIVE CARE: ICD-10-CM

## 2024-02-26 DIAGNOSIS — T78.40XA ALLERGY, INITIAL ENCOUNTER: ICD-10-CM

## 2024-02-26 DIAGNOSIS — R73.9 HYPERGLYCEMIA: ICD-10-CM

## 2024-02-26 PROCEDURE — 99214 OFFICE O/P EST MOD 30 MIN: CPT | Performed by: STUDENT IN AN ORGANIZED HEALTH CARE EDUCATION/TRAINING PROGRAM

## 2024-02-26 NOTE — ASSESSMENT & PLAN NOTE
Stable, controlled  Methocarbamol as needed  Stable on current medication and dosage. Will continue current management. Refill medication as necessary.  I did advise patient that decreasing his weight would help a lot with back pain

## 2024-02-26 NOTE — PROGRESS NOTES
Office Note     Name: Chary Boykin    : 2000     MRN: [id number]     Chief Complaint  Establish Care (Offboarding Dr. Beauchamp/)    Subjective     History of Present Illness:  Chary Boykin is a 24 y.o. male who presents today for chronic conditions.    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 He walks 2-3 miles 2-3 times a week. Highest weight was 310lbs.     Past Medical History:   Past Medical History:   Diagnosis Date    Allergic 2016    Shellfish & Stings    Hypoglycemia     Injury of back     mid to lower back injury    Low back pain 2017    Had microdiscetomy    Lumbosacral disc herniation     Obesity 2018    Pneumonia        Past Surgical History:   Past Surgical History:   Procedure Laterality Date    ADENOIDECTOMY  2014    BACK SURGERY      micro-discectomy    CHOLECYSTECTOMY      no stones    FRACTURE SURGERY  2023    Right lower extremity    SPINE SURGERY  2018    Microdiscetomy    TONSILLECTOMY AND ADENOIDECTOMY      WISDOM TOOTH EXTRACTION         Immunizations:   Immunization History   Administered Date(s) Administered    COVID-19 (MODERNA) 1st,2nd,3rd Dose Monovalent 2021, 2021    Fluzone (or Fluarix & Flulaval for VFC) >6mos 2021    Fluzone Quad >6mos (Multi-dose) 2018    Hep A, 2 Dose 2018    Hib (PRP-OMP) 2001    Hpv9 05/10/2018, 2018    MMR 2001    Meningococcal B,(Bexsero) 2021    PEDS-Pneumococcal Conjugate (PCV7) 2001    Tdap 2019        Medications:     Current Outpatient Medications:     EPIPEN 2-SOHAIL 0.3 MG/0.3ML solution auto-injector injection, ADM 0.3 MG IM 1 TIME PRF ANAPHYLAXIS, Disp: , Rfl: 0    methocarbamol (ROBAXIN) 750 MG tablet, Take 1 tablet by mouth 3 (Three) Times a Day As Needed for Muscle Spasms., Disp: 30 tablet, Rfl: 1    Allergies:   Allergies  "  Allergen Reactions    Bee Venom Anaphylaxis    Shellfish-Derived Products Anaphylaxis    Wasp Venom Anaphylaxis       Family History:   Family History   Problem Relation Age of Onset    Cancer Father     Anxiety disorder Father     Arthritis Father     Depression Father     Arthritis Mother     Asthma Mother     Colon cancer Neg Hx     Cirrhosis Neg Hx     Liver cancer Neg Hx     Liver disease Neg Hx        Social History:   Social History     Socioeconomic History    Marital status: Single   Tobacco Use    Smoking status: Every Day     Packs/day: 0.25     Years: 0.50     Additional pack years: 0.00     Total pack years: 0.13     Types: Cigarettes, Electronic Cigarette     Last attempt to quit: 2022     Years since quittin.3    Smokeless tobacco: Never    Tobacco comments:     Vape 95%, Smoke 5%   Vaping Use    Vaping Use: Never used   Substance and Sexual Activity    Alcohol use: Yes     Alcohol/week: 4.0 standard drinks of alcohol     Types: 2 Cans of beer, 2 Drinks containing 0.5 oz of alcohol per week     Comment: social    Drug use: Never    Sexual activity: Yes     Partners: Female     Birth control/protection: Condom, Birth control pill       Health Maintenance   Topic Date Due    Pneumococcal Vaccine 0-64 (1 of 2 - PCV) 2006    ANNUAL PHYSICAL  2024    INFLUENZA VACCINE  2024 (Originally 2023)    COVID-19 Vaccine (3 - -24 season) 2025 (Originally 2023)    BMI FOLLOWUP  2025    TDAP/TD VACCINES (2 - Td or Tdap) 2029    HEPATITIS C SCREENING  Completed       Objective     Vital Signs  /68   Pulse 77   Resp 16   Ht 182.9 cm (72.01\")   Wt 125 kg (276 lb)   SpO2 98%   BMI 37.42 kg/m²   Estimated body mass index is 37.42 kg/m² as calculated from the following:    Height as of this encounter: 182.9 cm (72.01\").    Weight as of this encounter: 125 kg (276 lb).    Class 2 Severe Obesity (BMI >=35 and <=39.9). Obesity-related health conditions " include the following: none. Obesity is unchanged. BMI is is above average; BMI management plan is completed. We discussed portion control, increasing exercise, and pharmacologic options including GLP-1 .      Physical Exam  Vitals and nursing note reviewed.   Constitutional:       Appearance: Normal appearance. He is obese.   HENT:      Head: Normocephalic and atraumatic.   Cardiovascular:      Rate and Rhythm: Normal rate and regular rhythm.      Pulses: Normal pulses.      Heart sounds: Normal heart sounds.   Pulmonary:      Effort: Pulmonary effort is normal. No respiratory distress.      Breath sounds: Normal breath sounds. No wheezing, rhonchi or rales.   Abdominal:      General: Abdomen is flat. Bowel sounds are normal.      Palpations: Abdomen is soft.      Tenderness: There is no abdominal tenderness. There is no guarding.   Musculoskeletal:      Cervical back: Neck supple.   Skin:     General: Skin is warm.      Capillary Refill: Capillary refill takes less than 2 seconds.   Neurological:      General: No focal deficit present.      Mental Status: He is alert. Mental status is at baseline.   Psychiatric:         Mood and Affect: Mood normal.         Behavior: Behavior normal.          Procedures     Assessment and Plan     Diagnoses and all orders for this visit:    1. Class 2 severe obesity with serious comorbidity and body mass index (BMI) of 37.0 to 37.9 in adult, unspecified obesity type (Primary)  Assessment & Plan:  Will obtain A1c and labs for now.  Referred to nutritionist.  Patient's (Body mass index is 37.42 kg/m².) indicates that they are morbidly/severely obese (BMI > 40 or > 35 with obesity - related health condition) with health conditions that include none . Weight is unchanged. BMI  is above average; BMI management plan is completed. We discussed low calorie, low carb based diet program, portion control, increasing exercise, and pharmacologic options including GLP-1 .     Orders:  -     CBC  & Differential  -     Comprehensive Metabolic Panel  -     Lipid Panel  -     TSH Rfx On Abnormal To Free T4  -     Ambulatory Referral to Nutrition Services    2. Allergy, initial encounter  Assessment & Plan:  Stable, has epinephrine on hand    Orders:  -     CBC & Differential  -     Comprehensive Metabolic Panel  -     Lipid Panel  -     TSH Rfx On Abnormal To Free T4    3. Low back pain with right-sided sciatica, unspecified back pain laterality, unspecified chronicity  Assessment & Plan:  Stable, controlled  Methocarbamol as needed  Stable on current medication and dosage. Will continue current management. Refill medication as necessary.  I did advise patient that decreasing his weight would help a lot with back pain    Orders:  -     CBC & Differential  -     Comprehensive Metabolic Panel  -     Lipid Panel  -     TSH Rfx On Abnormal To Free T4    4. Encounter for preventive care  -     CBC & Differential  -     Comprehensive Metabolic Panel  -     Lipid Panel  -     TSH Rfx On Abnormal To Free T4    5. Hyperglycemia  Assessment & Plan:  Will obtain A1c  Referred to nutritionist  Discussed the possible need for GLP-1 injections.  Patient was interested    Orders:  -     Hemoglobin A1c         Counseling was given to patient for the following topics: instructions for management, risks and benefits of treatment options, and importance of treatment compliance.    Follow Up  Return in about 6 months (around 8/26/2024) for Annual.    MD MIC Benoit MR  [facility]  [address]  [facility] [place][note 3] [id number] [phone]

## 2024-02-26 NOTE — ASSESSMENT & PLAN NOTE
Will obtain A1c and labs for now.  Referred to nutritionist.  Patient's (Body mass index is 37.42 kg/m².) indicates that they are morbidly/severely obese (BMI > 40 or > 35 with obesity - related health condition) with health conditions that include none . Weight is unchanged. BMI  is above average; BMI management plan is completed. We discussed low calorie, low carb based diet program, portion control, increasing exercise, and pharmacologic options including GLP-1 .

## 2024-02-26 NOTE — ASSESSMENT & PLAN NOTE
Will obtain A1c  Referred to nutritionist  Discussed the possible need for GLP-1 injections.  Patient was interested

## 2024-04-24 ENCOUNTER — HOSPITAL ENCOUNTER (EMERGENCY)
Facility: HOSPITAL | Age: 24
Discharge: HOME | End: 2024-04-24
Attending: STUDENT IN AN ORGANIZED HEALTH CARE EDUCATION/TRAINING PROGRAM
Payer: COMMERCIAL

## 2024-04-24 VITALS
WEIGHT: 265 LBS | DIASTOLIC BLOOD PRESSURE: 54 MMHG | BODY MASS INDEX: 35.89 KG/M2 | SYSTOLIC BLOOD PRESSURE: 104 MMHG | HEART RATE: 98 BPM | OXYGEN SATURATION: 95 % | HEIGHT: 72 IN | TEMPERATURE: 98 F | RESPIRATION RATE: 12 BRPM

## 2024-04-24 DIAGNOSIS — T78.2XXA ANAPHYLAXIS, INITIAL ENCOUNTER: Primary | ICD-10-CM

## 2024-04-24 PROCEDURE — 99283 EMERGENCY DEPT VISIT LOW MDM: CPT

## 2024-04-24 RX ORDER — PREDNISONE 20 MG/1
40 TABLET ORAL DAILY
Qty: 10 TABLET | Refills: 0 | Status: SHIPPED | OUTPATIENT
Start: 2024-04-24 | End: 2024-04-29

## 2024-04-24 RX ORDER — SODIUM CHLORIDE 0.9 % (FLUSH) 0.9 %
10 SYRINGE (ML) INJECTION AS NEEDED
Status: DISCONTINUED | OUTPATIENT
Start: 2024-04-24 | End: 2024-04-24 | Stop reason: HOSPADM

## 2024-04-24 NOTE — DISCHARGE INSTRUCTIONS
You were observed after an anaphylactic reaction.  We observed your emergency department and you had no recurrence interested for discharge.  We will treat you with steroids over the next 5 days.  Please take these as directed.  You can also take Benadryl and Pepcid as needed.  Would follow primary care doctor to ensure that you are improving appropriately.  You are now stable for discharge.

## 2024-04-24 NOTE — ED PROVIDER NOTES
Subjective:  History of Present Illness:    Patient is a 24-year-old male with history of obesity who presents today with anaphylactic reaction.  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  States that he was eating sushi today and was unaware that the sushi roll had crab in it.  Says that he ate the crab, immediately had an allergic reaction.  Says that he was administered epinephrine, Solu-Medrol, DuoNeb, Zofran prior to arrival.  States his symptoms had resolved and route.  He presents in no respiratory distress, no urticaria noted, denies abdominal pain, stable blood pressure.  No preceding medical complaints.      Nurses Notes reviewed and agree, including vitals, allergies, social history and prior medical history.     REVIEW OF SYSTEMS: All systems reviewed and not pertinent unless noted.  Review of Systems   Constitutional:  Positive for activity change. Negative for appetite change, chills, fatigue and fever.   HENT:  Negative for congestion, sinus pressure, sneezing and trouble swallowing.    Eyes:  Negative for discharge and itching.   Respiratory:  Negative for cough and shortness of breath.    Cardiovascular:  Negative for chest pain and palpitations.   Gastrointestinal:  Negative for abdominal distention, abdominal pain, diarrhea, nausea and vomiting.   Endocrine: Negative for cold intolerance and heat intolerance.   Genitourinary:  Negative for decreased urine volume, dysuria and urgency.   Musculoskeletal:  Negative for gait problem, neck pain and neck stiffness.   Skin:  Negative for color change and rash.   Allergic/Immunologic: Negative for immunocompromised state.   Neurological:  Negative for facial asymmetry and headaches.   Hematological:  Negative for adenopathy.   Psychiatric/Behavioral:  Negative for self-injury and suicidal ideas.        Past Medical History:   Diagnosis Date    Allergic 2016    Shellfish & Stings    Hypoglycemia     Injury of back     mid to lower back injury    Low  "back pain 2017    Had microdiscetomy    Lumbosacral disc herniation     Obesity 2018    Pneumonia        Allergies:    Bee venom, Shellfish-derived products, and Wasp venom      Past Surgical History:   Procedure Laterality Date    ADENOIDECTOMY  2014    BACK SURGERY      micro-discectomy    CHOLECYSTECTOMY      no stones    FRACTURE SURGERY  2023    Right lower extremity    SPINE SURGERY  2018    Microdiscetomy    TONSILLECTOMY AND ADENOIDECTOMY      WISDOM TOOTH EXTRACTION           Social History     Socioeconomic History    Marital status: Single   Tobacco Use    Smoking status: Every Day     Current packs/day: 0.00     Average packs/day: 0.3 packs/day for 0.5 years (0.1 ttl pk-yrs)     Types: Cigarettes, Electronic Cigarette     Start date: 5/3/2022     Last attempt to quit: 2022     Years since quittin.4    Smokeless tobacco: Never    Tobacco comments:     Vape 95%, Smoke 5%   Vaping Use    Vaping status: Never Used   Substance and Sexual Activity    Alcohol use: Yes     Alcohol/week: 4.0 standard drinks of alcohol     Types: 2 Cans of beer, 2 Drinks containing 0.5 oz of alcohol per week     Comment: social    Drug use: Never    Sexual activity: Yes     Partners: Female     Birth control/protection: Condom, Birth control pill         Family History   Problem Relation Age of Onset    Cancer Father     Anxiety disorder Father     Arthritis Father     Depression Father     Arthritis Mother     Asthma Mother     Colon cancer Neg Hx     Cirrhosis Neg Hx     Liver cancer Neg Hx     Liver disease Neg Hx        Objective  Physical Exam:  /72 (BP Location: Left arm, Patient Position: Lying)   Pulse 113   Temp 98 °F (36.7 °C) (Oral)   Resp 12   Ht 182.9 cm (72\")   Wt 120 kg (265 lb)   SpO2 98%   BMI 35.94 kg/m²      Physical Exam  Constitutional:       General: He is not in acute distress.     Appearance: Normal appearance. He is normal weight. He is not ill-appearing.   HENT:      Head: " Normocephalic and atraumatic.      Nose: Nose normal. No congestion or rhinorrhea.      Mouth/Throat:      Mouth: Mucous membranes are moist.      Pharynx: Oropharynx is clear.   Eyes:      Extraocular Movements: Extraocular movements intact.      Conjunctiva/sclera: Conjunctivae normal.      Pupils: Pupils are equal, round, and reactive to light.   Cardiovascular:      Rate and Rhythm: Normal rate and regular rhythm.      Pulses: Normal pulses.   Pulmonary:      Effort: Pulmonary effort is normal. No respiratory distress.      Breath sounds: Normal breath sounds.   Abdominal:      General: Abdomen is flat. Bowel sounds are normal. There is no distension.      Palpations: Abdomen is soft.      Tenderness: There is no abdominal tenderness. There is no right CVA tenderness, left CVA tenderness, guarding or rebound.   Musculoskeletal:         General: No swelling or tenderness. Normal range of motion.      Cervical back: Normal range of motion and neck supple. No rigidity or tenderness.      Right lower leg: No edema.      Left lower leg: No edema.   Skin:     General: Skin is warm and dry.      Capillary Refill: Capillary refill takes less than 2 seconds.   Neurological:      General: No focal deficit present.      Mental Status: He is alert and oriented to person, place, and time. Mental status is at baseline.      Cranial Nerves: No cranial nerve deficit.      Sensory: No sensory deficit.      Motor: No weakness.   Psychiatric:         Mood and Affect: Mood normal.         Behavior: Behavior normal.         Thought Content: Thought content normal.         Judgment: Judgment normal.         Procedures    ED Course:    ED Course as of 04/24/24 1526   Wed Apr 24, 2024   1417 EKG interpreted by me, sinus tachycardia with no concerning ST changes noted, rate of 107 [JE]      ED Course User Index  [JE] Brandon Mobley MD       Lab Results (last 24 hours)       ** No results found for the last 24 hours. **              No radiology results from the last 24 hrs       MDM      Initial impression of presenting illness: Anaphylactic reaction    DDX: includes but is not limited to: Anaphylaxis, urticaria, angioedema    Patient arrives stable with vitals interpreted by myself.     Pertinent features from physical exam: Clear to station, recurrent rhythm, no murmur, nontender abdominal palpation, no respiratory distress, no wheezing, no urticaria.    Initial diagnostic plan: No need for labs    Results from initial plan were reviewed and interpreted by me revealing see above    Diagnostic information from other sources: Discussed with EMS on arrival and reviewed past medical records    Interventions / Re-evaluation: Observed emergency department for an hour and a half with no recurrence    Results/clinical rationale were discussed with patient at bedside    Consultations/Discussion of results with other physicians: Patient observed for an hour and a half with no recurrence of symptoms.  Given this, will discharge.  Given prescription for prednisone and encouraged PCP follow-up to ensure resolution.  Given strict return precautions to emergency department for any recurrence of symptoms    Disposition plan: Discharge  -----        Final diagnoses:   Anaphylaxis, initial encounter          Brandon Mobley MD  04/24/24 8806

## 2024-04-24 NOTE — Clinical Note
[facility] EMERGENCY DEPARTMENT  [address] [address]  Phone: [phone]    Chary Boykin was seen and treated in our emergency department on 4/24/2024.  He may return to work on 04/27/2024.         Thank you for choosing [facility].    Brandon Mobley MD

## 2024-06-23 ENCOUNTER — HOSPITAL ENCOUNTER (EMERGENCY)
Facility: HOSPITAL | Age: 24
Discharge: HOME | End: 2024-06-24
Attending: STUDENT IN AN ORGANIZED HEALTH CARE EDUCATION/TRAINING PROGRAM | Admitting: STUDENT IN AN ORGANIZED HEALTH CARE EDUCATION/TRAINING PROGRAM
Payer: COMMERCIAL

## 2024-06-23 DIAGNOSIS — T61.781A ALLERGIC REACTION TO SHELLFISH: Primary | ICD-10-CM

## 2024-06-23 LAB
ALBUMIN SERPL-MCNC: 4.7 G/DL (ref 3.5–5.2)
ALBUMIN/GLOB SERPL: 1.5 G/DL
ALP SERPL-CCNC: 93 U/L (ref 39–117)
ALT SERPL W P-5'-P-CCNC: 31 U/L (ref 1–41)
ANION GAP SERPL CALCULATED.3IONS-SCNC: 11.5 MMOL/L (ref 5–15)
AST SERPL-CCNC: 17 U/L (ref 1–40)
BASOPHILS # BLD AUTO: 0.11 10*3/MM3 (ref 0–0.2)
BASOPHILS NFR BLD AUTO: 0.9 % (ref 0–1.5)
BILIRUB SERPL-MCNC: 0.3 MG/DL (ref 0–1.2)
BUN SERPL-MCNC: 13 MG/DL (ref 6–20)
BUN/CREAT SERPL: 12.5 (ref 7–25)
CALCIUM SPEC-SCNC: 9.5 MG/DL (ref 8.6–10.5)
CHLORIDE SERPL-SCNC: 101 MMOL/L (ref 98–107)
CO2 SERPL-SCNC: 23.5 MMOL/L (ref 22–29)
CREAT SERPL-MCNC: 1.04 MG/DL (ref 0.76–1.27)
DEPRECATED RDW RBC AUTO: 40.5 FL (ref 37–54)
EGFRCR SERPLBLD CKD-EPI 2021: 102.8 ML/MIN/1.73
EOSINOPHIL # BLD AUTO: 0.25 10*3/MM3 (ref 0–0.4)
EOSINOPHIL NFR BLD AUTO: 2.1 % (ref 0.3–6.2)
ERYTHROCYTE [DISTWIDTH] IN BLOOD BY AUTOMATED COUNT: 12.4 % (ref 12.3–15.4)
GLOBULIN UR ELPH-MCNC: 3.1 GM/DL
GLUCOSE SERPL-MCNC: 114 MG/DL (ref 65–99)
HCT VFR BLD AUTO: 43.5 % (ref 37.5–51)
HGB BLD-MCNC: 15.3 G/DL (ref 13–17.7)
IMM GRANULOCYTES # BLD AUTO: 0.02 10*3/MM3 (ref 0–0.05)
IMM GRANULOCYTES NFR BLD AUTO: 0.2 % (ref 0–0.5)
LYMPHOCYTES # BLD AUTO: 3.41 10*3/MM3 (ref 0.7–3.1)
LYMPHOCYTES NFR BLD AUTO: 28.8 % (ref 19.6–45.3)
MCH RBC QN AUTO: 31.2 PG (ref 26.6–33)
MCHC RBC AUTO-ENTMCNC: 35.2 G/DL (ref 31.5–35.7)
MCV RBC AUTO: 88.6 FL (ref 79–97)
MONOCYTES # BLD AUTO: 0.98 10*3/MM3 (ref 0.1–0.9)
MONOCYTES NFR BLD AUTO: 8.3 % (ref 5–12)
NEUTROPHILS NFR BLD AUTO: 59.7 % (ref 42.7–76)
NEUTROPHILS NFR BLD AUTO: 7.07 10*3/MM3 (ref 1.7–7)
NRBC BLD AUTO-RTO: 0 /100 WBC (ref 0–0.2)
PLATELET # BLD AUTO: 258 10*3/MM3 (ref 140–450)
PMV BLD AUTO: 10.1 FL (ref 6–12)
POTASSIUM SERPL-SCNC: 3.1 MMOL/L (ref 3.5–5.2)
PROT SERPL-MCNC: 7.8 G/DL (ref 6–8.5)
RBC # BLD AUTO: 4.91 10*6/MM3 (ref 4.14–5.8)
SODIUM SERPL-SCNC: 136 MMOL/L (ref 136–145)
WBC NRBC COR # BLD AUTO: 11.84 10*3/MM3 (ref 3.4–10.8)

## 2024-06-23 PROCEDURE — 99283 EMERGENCY DEPT VISIT LOW MDM: CPT

## 2024-06-23 PROCEDURE — 25010000002 DEXAMETHASONE SODIUM PHOSPHATE 10 MG/ML SOLUTION: Performed by: STUDENT IN AN ORGANIZED HEALTH CARE EDUCATION/TRAINING PROGRAM

## 2024-06-23 PROCEDURE — 96375 TX/PRO/DX INJ NEW DRUG ADDON: CPT

## 2024-06-23 PROCEDURE — 85025 COMPLETE CBC W/AUTO DIFF WBC: CPT | Performed by: STUDENT IN AN ORGANIZED HEALTH CARE EDUCATION/TRAINING PROGRAM

## 2024-06-23 PROCEDURE — 80053 COMPREHEN METABOLIC PANEL: CPT | Performed by: STUDENT IN AN ORGANIZED HEALTH CARE EDUCATION/TRAINING PROGRAM

## 2024-06-23 PROCEDURE — 25010000002 DIPHENHYDRAMINE PER 50 MG: Performed by: STUDENT IN AN ORGANIZED HEALTH CARE EDUCATION/TRAINING PROGRAM

## 2024-06-23 PROCEDURE — 96374 THER/PROPH/DIAG INJ IV PUSH: CPT

## 2024-06-23 RX ORDER — FAMOTIDINE 10 MG/ML
20 INJECTION, SOLUTION INTRAVENOUS ONCE
Status: COMPLETED | OUTPATIENT
Start: 2024-06-23 | End: 2024-06-23

## 2024-06-23 RX ORDER — DIPHENHYDRAMINE HYDROCHLORIDE 50 MG/ML
25 INJECTION INTRAMUSCULAR; INTRAVENOUS ONCE
Status: COMPLETED | OUTPATIENT
Start: 2024-06-23 | End: 2024-06-23

## 2024-06-23 RX ORDER — SODIUM CHLORIDE 0.9 % (FLUSH) 0.9 %
10 SYRINGE (ML) INJECTION AS NEEDED
Status: DISCONTINUED | OUTPATIENT
Start: 2024-06-23 | End: 2024-06-24 | Stop reason: HOSPADM

## 2024-06-23 RX ORDER — DEXAMETHASONE SODIUM PHOSPHATE 10 MG/ML
10 INJECTION, SOLUTION INTRAMUSCULAR; INTRAVENOUS ONCE
Status: COMPLETED | OUTPATIENT
Start: 2024-06-23 | End: 2024-06-23

## 2024-06-23 RX ADMIN — FAMOTIDINE 20 MG: 10 INJECTION, SOLUTION INTRAVENOUS at 22:33

## 2024-06-23 RX ADMIN — DEXAMETHASONE SODIUM PHOSPHATE 10 MG: 10 INJECTION INTRAMUSCULAR; INTRAVENOUS at 22:33

## 2024-06-23 RX ADMIN — DIPHENHYDRAMINE HYDROCHLORIDE 25 MG: 50 INJECTION, SOLUTION INTRAMUSCULAR; INTRAVENOUS at 22:32

## 2024-06-24 VITALS
DIASTOLIC BLOOD PRESSURE: 66 MMHG | TEMPERATURE: 98.4 F | OXYGEN SATURATION: 96 % | BODY MASS INDEX: 36.84 KG/M2 | HEART RATE: 74 BPM | WEIGHT: 272 LBS | HEIGHT: 72 IN | SYSTOLIC BLOOD PRESSURE: 100 MMHG | RESPIRATION RATE: 18 BRPM

## 2024-06-24 RX ORDER — EPINEPHRINE 0.3 MG/.3ML
0.3 INJECTION SUBCUTANEOUS ONCE
Qty: 1 EACH | Refills: 0 | Status: SHIPPED | OUTPATIENT
Start: 2024-06-24 | End: 2024-06-24

## 2024-06-24 NOTE — ED NOTES
Pt reports he fells better, calm and cooperative. Pt is AAOX4, Airway is patent, RR are equal and unlabored. Friends at bedside.

## 2024-06-24 NOTE — ED PROVIDER NOTES
EMERGENCY DEPARTMENT ENCOUNTER    Pt Name: Chary Boykin  MRN: [id number]  Pt :   2000  Room Number:  1515  Date of encounter:  2024  PCP: Provider, No Known  ED Provider: Jaiden Mojica MD    Historian: Patient      HPI:  Chief Complaint: Allergic reaction        Context: Chary Boykin is a 24 y.o. male who presents to the ED c/o allergic reaction.  Patient states he was eating leftovers tonight that he thinks were contaminated with shellfish as he started having signs of anaphylaxis.  Reports severe allergy to shellfish in the past with anaphylaxis in the past as well.  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  States he feels like his tongue is numb.      PAST MEDICAL HISTORY  Past Medical History:   Diagnosis Date    Allergic 2016    Shellfish & Stings    Hypoglycemia     Injury of back     mid to lower back injury    Low back pain 2017    Had microdiscetomy    Lumbosacral disc herniation     Obesity 2018    Pneumonia          PAST SURGICAL HISTORY  Past Surgical History:   Procedure Laterality Date    ADENOIDECTOMY  2014    BACK SURGERY      micro-discectomy    CHOLECYSTECTOMY      no stones    FRACTURE SURGERY  2023    Right lower extremity    SPINE SURGERY  2018    Microdiscetomy    TONSILLECTOMY AND ADENOIDECTOMY      WISDOM TOOTH EXTRACTION           FAMILY HISTORY  Family History   Problem Relation Age of Onset    Cancer Father     Anxiety disorder Father     Arthritis Father     Depression Father     Arthritis Mother     Asthma Mother     Colon cancer Neg Hx     Cirrhosis Neg Hx     Liver cancer Neg Hx     Liver disease Neg Hx          SOCIAL HISTORY  Social History     Socioeconomic History    Marital status: Single   Tobacco Use    Smoking status: Every Day     Current packs/day: 0.00     Average packs/day: 0.3 packs/day for 0.5 years (0.1 ttl pk-yrs)     Types: Cigarettes, Electronic Cigarette     Start date: 5/3/2022     Last attempt  to quit: 2022     Years since quittin.6    Smokeless tobacco: Never    Tobacco comments:     Vape 95%, Smoke 5%   Vaping Use    Vaping status: Never Used   Substance and Sexual Activity    Alcohol use: Yes     Alcohol/week: 4.0 standard drinks of alcohol     Types: 2 Cans of beer, 2 Drinks containing 0.5 oz of alcohol per week     Comment: social    Drug use: Never    Sexual activity: Yes     Partners: Female     Birth control/protection: Condom, Birth control pill         ALLERGIES  Bee venom, Shellfish-derived products, and Wasp venom        REVIEW OF SYSTEMS  Review of Systems     All systems reviewed and negative except for those discussed in HPI.       PHYSICAL EXAM    I have reviewed the triage vital signs and nursing notes.    ED Triage Vitals [24]   Temp Heart Rate Resp BP SpO2   98.2 °F (36.8 °C) (!) 123 20 (!) 158/104 100 %      Temp src Heart Rate Source Patient Position BP Location FiO2 (%)   Oral Monitor Sitting Left arm --       Physical Exam    General:  Awake, alert, anxious appearing  HEENT: Atraumatic, normocephalic, EOMI, PERRLA, mucous membranes moist  NECK:  Supple, atraumatic  Cardiovascular: Tachycardic, regular rhythm, no murmurs, rubs, or gallops.  Extremities well perfused   Respiratory: No stridor or wheezing.  Regular rate, clear lungs to auscultation bilaterally.   Abdominal:  Soft, nondistended, nontender.  No guarding or rebound.  No palpable masses  Extremity:  No visible bony abnormalities in all 4 extremities.  Full range of motion of all extremities.  Skin:  Warm and dry.  No rashes  Neuro:  AAOx3, GCS 15. Cranial nerves 2-12 grossly intact.  No focal strength or sensation deficits.  Psych:  Mood and affect appropriate.        LAB RESULTS  Recent Results (from the past 24 hour(s))   Comprehensive Metabolic Panel    Collection Time: 24 10:26 PM    Specimen: Blood   Result Value Ref Range    Glucose 114 (H) 65 - 99 mg/dL    BUN 13 6 - 20 mg/dL    Creatinine  1.04 0.76 - 1.27 mg/dL    Sodium 136 136 - 145 mmol/L    Potassium 3.1 (L) 3.5 - 5.2 mmol/L    Chloride 101 98 - 107 mmol/L    CO2 23.5 22.0 - 29.0 mmol/L    Calcium 9.5 8.6 - 10.5 mg/dL    Total Protein 7.8 6.0 - 8.5 g/dL    Albumin 4.7 3.5 - 5.2 g/dL    ALT (SGPT) 31 1 - 41 U/L    AST (SGOT) 17 1 - 40 U/L    Alkaline Phosphatase 93 39 - 117 U/L    Total Bilirubin 0.3 0.0 - 1.2 mg/dL    Globulin 3.1 gm/dL    A/G Ratio 1.5 g/dL    BUN/Creatinine Ratio 12.5 7.0 - 25.0    Anion Gap 11.5 5.0 - 15.0 mmol/L    eGFR 102.8 >60.0 mL/min/1.73   CBC Auto Differential    Collection Time: 06/23/24 10:26 PM    Specimen: Blood   Result Value Ref Range    WBC 11.84 (H) 3.40 - 10.80 10*3/mm3    RBC 4.91 4.14 - 5.80 10*6/mm3    Hemoglobin 15.3 13.0 - 17.7 g/dL    Hematocrit 43.5 37.5 - 51.0 %    MCV 88.6 79.0 - 97.0 fL    MCH 31.2 26.6 - 33.0 pg    MCHC 35.2 31.5 - 35.7 g/dL    RDW 12.4 12.3 - 15.4 %    RDW-SD 40.5 37.0 - 54.0 fl    MPV 10.1 6.0 - 12.0 fL    Platelets 258 140 - 450 10*3/mm3    Neutrophil % 59.7 42.7 - 76.0 %    Lymphocyte % 28.8 19.6 - 45.3 %    Monocyte % 8.3 5.0 - 12.0 %    Eosinophil % 2.1 0.3 - 6.2 %    Basophil % 0.9 0.0 - 1.5 %    Immature Grans % 0.2 0.0 - 0.5 %    Neutrophils, Absolute 7.07 (H) 1.70 - 7.00 10*3/mm3    Lymphocytes, Absolute 3.41 (H) 0.70 - 3.10 10*3/mm3    Monocytes, Absolute 0.98 (H) 0.10 - 0.90 10*3/mm3    Eosinophils, Absolute 0.25 0.00 - 0.40 10*3/mm3    Basophils, Absolute 0.11 0.00 - 0.20 10*3/mm3    Immature Grans, Absolute 0.02 0.00 - 0.05 10*3/mm3    nRBC 0.0 0.0 - 0.2 /100 WBC       If labs were ordered, I independently reviewed the results and considered them in treating the patient.          PROCEDURES    Procedures    No orders to display       MEDICATIONS GIVEN IN ER    Medications   sodium chloride 0.9 % flush 10 mL (has no administration in time range)   dexAMETHasone sodium phosphate injection 10 mg (10 mg Intravenous Given 6/23/24 2233)   diphenhydrAMINE (BENADRYL) injection  25 mg (25 mg Intravenous Given 6/23/24 2232)   famotidine (PEPCID) injection 20 mg (20 mg Intravenous Given 6/23/24 2233)         MEDICAL DECISION MAKING, PROGRESS, and CONSULTS    All labs, if obtained, have been independently reviewed by me.  All radiology studies, if obtained, have been reviewed by me and the radiologist dictating the report.  All EKG's, if obtained, have been independently viewed and interpreted by me.      Discussion below represents my analysis of pertinent findings related to patient's condition, differential diagnosis, treatment plan and final disposition.    Chary Boykin is a 24 y.o. male who presents to the ED c/o allergic reaction.  Tachycardic on arrival but otherwise hemodynamically stable and mentating appropriately.  No significant soft tissue swelling noted although patient does report sensation of tongue numbness.  Had already dosed himself twice with IM epinephrine prior to arrival.  Patient given IV dexamethasone and IV diphenhydramine along with IV Pepcid for symptomatic treatment and prophylaxis of worsening of allergic reaction.    Patient observed in emergency department for over 4 hours with no return of symptoms.    Advised on return precautions the importance of close follow-up with primary care provider.  Provided with prescription for EpiPen.  Patient discharged.                                 Orders placed during this visit:  Orders Placed This Encounter   Procedures    Comprehensive Metabolic Panel    CBC Auto Differential    Insert Peripheral IV    CBC & Differential         ED Course:    Consultants:                  Shared Decision Making:  After my consideration of clinical presentation and any laboratory/radiology studies obtained, I discussed the findings with the patient/patient representative who is in agreement with the treatment plan and the final disposition.   Risks and benefits of discharge and/or observation/admission were discussed.      AS OF  03:11 EDT VITALS:    BP - 100/66  HR - 74  TEMP - 98.4 °F (36.9 °C) (Oral)  O2 SATS - 96%                  DIAGNOSIS  Final diagnoses:   Allergic reaction to shellfish         DISPOSITION  Discharge      Please note that portions of this document were completed with voice recognition software.        Jaiden Mojica MD  06/24/24 4936

## 2024-06-24 NOTE — ED NOTES
Pt lying on his right side resting with eyes closed. RR are equal and unlabored. S/o at bedside. Bed left in lowest position, call light within reach, side rails up x2.

## 2024-08-05 ENCOUNTER — HOSPITAL ENCOUNTER (EMERGENCY)
Facility: HOSPITAL | Age: 24
Discharge: HOME | End: 2024-08-06
Attending: EMERGENCY MEDICINE
Payer: OTHER MISCELLANEOUS

## 2024-08-05 DIAGNOSIS — S84.11XA INJURY OF RIGHT PERONEAL NERVE, INITIAL ENCOUNTER: Primary | ICD-10-CM

## 2024-08-05 PROCEDURE — 99283 EMERGENCY DEPT VISIT LOW MDM: CPT

## 2024-08-06 ENCOUNTER — APPOINTMENT (OUTPATIENT)
Dept: GENERAL RADIOLOGY | Facility: HOSPITAL | Age: 24
End: 2024-08-06
Payer: OTHER MISCELLANEOUS

## 2024-08-06 VITALS
HEIGHT: 72 IN | SYSTOLIC BLOOD PRESSURE: 112 MMHG | WEIGHT: 265 LBS | TEMPERATURE: 97.5 F | RESPIRATION RATE: 18 BRPM | BODY MASS INDEX: 35.89 KG/M2 | DIASTOLIC BLOOD PRESSURE: 71 MMHG | OXYGEN SATURATION: 96 % | HEART RATE: 68 BPM

## 2024-08-06 PROCEDURE — 73590 X-RAY EXAM OF LOWER LEG: CPT

## 2024-08-06 PROCEDURE — 73610 X-RAY EXAM OF ANKLE: CPT

## 2024-08-06 PROCEDURE — 73630 X-RAY EXAM OF FOOT: CPT

## 2024-08-06 NOTE — ED NOTES
Pt able to demonstrate crutch walking. Pt discharged home in stable condition. Pt left ambulatory accompanied by S/O.

## 2024-08-06 NOTE — DISCHARGE INSTRUCTIONS
You should be nonweightbearing to the right foot until cleared by orthopedic surgery.  You need to follow-up with both occupational medicine and with orthopedic surgery.  You should return to the emergency department for any concerning symptoms, worsening symptoms or new concerns.

## 2024-08-06 NOTE — ED PROVIDER NOTES
EMERGENCY DEPARTMENT ENCOUNTER    Pt Name: Chary Boykin  MRN: [id number]  Pt :   2000  Room Number:    Date of encounter:  2024  PCP: Provider, No Known  ED Provider: Balbir Edwards MD    Historian: Patient      HPI:  Chief Complaint: Right foot numbness        Context: Chary Boykin is a 24 y.o. male who presents to the ED c/o right foot numbness.  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  Patient states that he kicked in a door a couple hours ago.  He says while walking out of the house he began having numbness about his right foot and difficulty with dorsiflexion about the right foot.  He says about a year and a half ago he had a fracture of his distal right tibia and fibula requiring open reduction internal fixation.  He says that he has known hardware fracture within this hardware previously.  He denies having any knee pain, ankle or foot pain.  He denies any other complaints.      PAST MEDICAL HISTORY  Past Medical History:   Diagnosis Date    Allergic 2016    Shellfish & Stings    Ankle injury, right, subsequent encounter     Hypoglycemia     Injury of back     mid to lower back injury    Low back pain 2017    Had microdiscetomy    Lumbosacral disc herniation     Obesity 2018    Pneumonia          PAST SURGICAL HISTORY  Past Surgical History:   Procedure Laterality Date    ADENOIDECTOMY  2014    BACK SURGERY      micro-discectomy    CHOLECYSTECTOMY      no stones    FRACTURE SURGERY  2023    Right lower extremity    SPINE SURGERY  2018    Microdiscetomy    TONSILLECTOMY AND ADENOIDECTOMY      WISDOM TOOTH EXTRACTION           FAMILY HISTORY  Family History   Problem Relation Age of Onset    Cancer Father     Anxiety disorder Father     Arthritis Father     Depression Father     Arthritis Mother     Asthma Mother     Colon cancer Neg Hx     Cirrhosis Neg Hx     Liver cancer Neg Hx     Liver disease Neg Hx          SOCIAL HISTORY  Social History      Socioeconomic History    Marital status: Single   Tobacco Use    Smoking status: Every Day     Current packs/day: 0.00     Average packs/day: 0.3 packs/day for 0.5 years (0.1 ttl pk-yrs)     Types: Cigarettes, Electronic Cigarette     Start date: 5/3/2022     Last attempt to quit: 2022     Years since quittin.7    Smokeless tobacco: Never    Tobacco comments:     Vape 95%, Smoke 5%   Vaping Use    Vaping status: Never Used   Substance and Sexual Activity    Alcohol use: Yes     Alcohol/week: 4.0 standard drinks of alcohol     Types: 2 Cans of beer, 2 Drinks containing 0.5 oz of alcohol per week     Comment: social    Drug use: Never    Sexual activity: Yes     Partners: Female     Birth control/protection: Condom, Birth control pill         ALLERGIES  Bee venom, Shellfish-derived products, and Wasp venom        REVIEW OF SYSTEMS    All systems reviewed and negative except for those discussed in HPI.       PHYSICAL EXAM    I have reviewed the triage vital signs and nursing notes.    ED Triage Vitals [24 2341]   Temp Heart Rate Resp BP SpO2   97.5 °F (36.4 °C) 86 18 134/92 96 %      Temp src Heart Rate Source Patient Position BP Location FiO2 (%)   Oral Monitor Sitting Left arm --         General: no acute distress, well-appearing, non-toxic  Skin: normal color, warm and dry.  No open wounds or lacerations.  Head: normocephalic, atraumatic  Nose: normal nasal mucosa, no visible deformity.  Mouth: moist mucous membranes.  Neck: supple.  Chest: no retractions, no visible deformity  Cardiovascular: Regular rate and rhythm.  Abdomen: soft, non-tender, non-distended. No rebound tenderness, no guarding.  Extremities: no cyanosis or edema. Palpable radial pulses bilaterally. Palpable dorsalis pedis pulses bilaterally.  No obvious deformity or injury.  Feet are warm and well-perfused bilaterally.  Capillary refill less than 2 seconds about the distal bilateral lower extremities.  Intact Munoz test  bilaterally (patient's feet plantar flex with squeezing the gastronemeus).    Neuro:  alert and oriented x3.  Patient is able to plantar flex the feet bilaterally.  He is able to dorsiflex the left foot without difficulty.  Patient has limited dorsiflexion about the right foot.  Patient is able to flex and extend the great toes bilaterally.  Psych:  appropriate mood and behavior.        LAB RESULTS  No results found for this or any previous visit (from the past 24 hour(s)).    If labs were ordered, I independently reviewed the results and considered them in treating the patient.  See medical decision making discussion section for my interpretation of lab results.        RADIOLOGY  No Radiology Exams Resulted Within Past 24 Hours    I ordered and independently reviewed the above noted radiographic studies.  See radiologist's dictation for official interpretation.    Per my independent reading:      Plain films of the right tibia and fibula show hardware within the distal tibia and fibula.  No acute osseous abnormality based on my independent reading.    Plan for him of the right ankle and foot are negative for acute osseous abnormalities based on my independent reading.        PROCEDURES    Splint - Cast - Strapping    Date/Time: 8/6/2024 4:19 AM    Performed by: Balbir Edwards MD  Authorized by: Balbir Edwards MD    Consent:     Consent obtained:  Verbal    Consent given by:  Patient    Risks discussed:  Discoloration, numbness and pain  Universal protocol:     Patient identity confirmed:  Verbally with patient  Pre-procedure details:     Pre-procedure CMS: Difficulty with dorsiflexion of the right foot.    Distal perfusion: distal pulses strong and brisk capillary refill    Procedure details:     Location:  Leg    Leg location:  R lower leg    Splint type:  Short leg    Supplies:  Fiberglass    Attestation: Splint applied and adjusted personally by me    Post-procedure details:     Distal neurologic exam:   Unchanged    Distal perfusion: brisk capillary refill      Procedure completion:  Tolerated well, no immediate complications      No orders to display       MEDICATIONS GIVEN IN ER    Medications - No data to display      MEDICAL DECISION MAKING, PROGRESS, and CONSULTS    All labs, if obtained, have been independently reviewed by me.  All radiology studies, if obtained, have been reviewed by me and the radiologist dictating the report.  All EKG's, if obtained, have been independently viewed and interpreted by me/my attending physician.      Discussion below represents my analysis of pertinent findings related to patient's condition, differential diagnosis, treatment plan and final disposition.                         Differential diagnosis:    Differential includes fracture, dislocation, neuropraxia, deep peroneal nerve injury, Achilles tendon laceration/rupture, other acute emergency.    Medical Decision Making Discussion:    Vitals reviewed and are normal.    Clinically, patient has neuropraxia versus deep peroneal nerve injury given difficulty with dorsiflexion of the right foot.  Films negative for acute fracture or dislocation based on my independent reading.    Patient has bounding dorsalis pedis pulses bilaterally and capillary refill is less than 2 seconds about the distal bilateral lower extremities.  I therefore doubt vascular compromise.    Patient has intact Munoz test bilaterally.  I therefore doubt Achilles tendon injury.    Patient placed into a posterior splint about the distal right lower extremity and instructed to be nonweightbearing.  He is instructed to follow-up with occupational medicine since this was a work related injury and with orthopedics.  He was instructed to return to the emerged apartment before then for any concerning symptoms, worsening symptoms or new concerns.    Additional sources:    - External (non-ED) record review: Family medicine note from February 2024 documenting  history of allergies requiring EpiPen.  Right-sided sciatica with low back pain.    Shared Decision Making:  After my consideration of clinical presentation and any laboratory/radiology studies obtained, I discussed the findings with the patient/patient representative who is in agreement with the treatment plan and the final disposition.   Risks and benefits of discharge and/or observation/admission were discussed.    Orders placed during this visit:  Orders Placed This Encounter   Procedures    Splint Cast Strapping    XR Tibia Fibula 2 View Right    XR Ankle 3+ View Right    XR Foot 3+ View Right    Ambulatory Referral to Occupational Medicine    Ambulatory Referral to Orthopedic Surgery (All except Pad)    Crutches (fit & training)       AS OF 04:20 EDT VITALS:    BP - 112/71  HR - 68  TEMP - 97.5 °F (36.4 °C) (Oral)  O2 SATS - 96%                  DIAGNOSIS  Final diagnoses:   Injury of right peroneal nerve, initial encounter         DISPOSITION  Discharge      Please note that portions of this document were completed with voice recognition software.        Balbir Edwards MD  08/06/24 0421

## 2024-08-27 ENCOUNTER — OFFICE VISIT (OUTPATIENT)
Dept: INTERNAL MEDICINE | Facility: CLINIC | Age: 24
End: 2024-08-27
Payer: COMMERCIAL

## 2024-08-27 VITALS
RESPIRATION RATE: 16 BRPM | HEART RATE: 95 BPM | DIASTOLIC BLOOD PRESSURE: 78 MMHG | BODY MASS INDEX: 37.11 KG/M2 | WEIGHT: 274 LBS | OXYGEN SATURATION: 97 % | HEIGHT: 72 IN | SYSTOLIC BLOOD PRESSURE: 126 MMHG

## 2024-08-27 DIAGNOSIS — T78.40XS ALLERGY, SEQUELA: Primary | ICD-10-CM

## 2024-08-27 DIAGNOSIS — M54.41 LOW BACK PAIN WITH RIGHT-SIDED SCIATICA, UNSPECIFIED BACK PAIN LATERALITY, UNSPECIFIED CHRONICITY: ICD-10-CM

## 2024-08-27 DIAGNOSIS — Z00.00 ENCOUNTER FOR PREVENTIVE CARE: ICD-10-CM

## 2024-08-27 DIAGNOSIS — R73.9 HYPERGLYCEMIA: ICD-10-CM

## 2024-08-27 DIAGNOSIS — F41.9 ANXIETY: ICD-10-CM

## 2024-08-27 DIAGNOSIS — E66.01 CLASS 2 SEVERE OBESITY WITH SERIOUS COMORBIDITY AND BODY MASS INDEX (BMI) OF 37.0 TO 37.9 IN ADULT, UNSPECIFIED OBESITY TYPE: ICD-10-CM

## 2024-08-27 DIAGNOSIS — M51.27 PROTRUSION OF INTERVERTEBRAL DISC OF LUMBOSACRAL REGION: ICD-10-CM

## 2024-08-27 DIAGNOSIS — M54.16 LUMBAR RADICULOPATHY: ICD-10-CM

## 2024-08-27 PROCEDURE — 99213 OFFICE O/P EST LOW 20 MIN: CPT | Performed by: STUDENT IN AN ORGANIZED HEALTH CARE EDUCATION/TRAINING PROGRAM

## 2024-08-27 PROCEDURE — 99395 PREV VISIT EST AGE 18-39: CPT | Performed by: STUDENT IN AN ORGANIZED HEALTH CARE EDUCATION/TRAINING PROGRAM

## 2024-08-27 RX ORDER — METHOCARBAMOL 750 MG/1
750 TABLET, FILM COATED ORAL 3 TIMES DAILY PRN
Qty: 30 TABLET | Refills: 1 | Status: SHIPPED | OUTPATIENT
Start: 2024-08-27

## 2024-08-27 RX ORDER — EPINEPHRINE 0.3 MG/.3ML
0.3 INJECTION SUBCUTANEOUS ONCE
COMMUNITY
Start: 2024-06-25

## 2024-08-27 NOTE — ASSESSMENT & PLAN NOTE
Patient's (Body mass index is 37.15 kg/m².) indicates that they are obese (BMI >30) with health conditions that include none . Weight is improving with lifestyle modifications. BMI  is above average; BMI management plan is completed. We discussed portion control and increasing exercise.

## 2024-08-27 NOTE — ASSESSMENT & PLAN NOTE
Light work duty this week for now  Reassess after trying NSAIDs and as needed methocarbamol  If unresolved or not improving, consider prednisone or Medrol Dosepak  Referred to physical therapy and orthopedics  Obtain MRI lumbosacral spine

## 2024-08-27 NOTE — ASSESSMENT & PLAN NOTE
Methocarbamol as needed  Secondary to history of protrusion of intervertebral disc in the lumbar sacral region.  Referred to physical therapy and orthopedics

## 2024-08-27 NOTE — PROGRESS NOTES
Office Note     Name: Chary Boykin    : 2000     MRN: [id number]     Chief Complaint  Annual Exam (Physical) and Back Pain (Started after illness.)    Subjective     History of Present Illness:  Chary Boykin is a 24 y.o. male who presents today for chronic conditions.    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 He walks 2-3 miles 2-3 times a week. Highest weight was 310lbs.   Degenerative disc disease, L5-S1 s/p microdissectomy: Bending over and lifting worsened the pain     Family History:   Family History   Problem Relation Age of Onset   • Cancer Father    • Anxiety disorder Father    • Arthritis Father    • Depression Father    • Arthritis Mother    • Asthma Mother    • Colon cancer Neg Hx    • Cirrhosis Neg Hx    • Liver cancer Neg Hx    • Liver disease Neg Hx        Social History:   Social History     Socioeconomic History   • Marital status: Single   Tobacco Use   • Smoking status: Every Day     Current packs/day: 0.00     Average packs/day: 0.3 packs/day for 0.5 years (0.1 ttl pk-yrs)     Types: Cigarettes, Electronic Cigarette     Start date: 5/3/2022     Last attempt to quit: 2022     Years since quittin.8   • Smokeless tobacco: Never   • Tobacco comments:     Vape 95%, Smoke 5%   Vaping Use   • Vaping status: Never Used   Substance and Sexual Activity   • Alcohol use: Yes     Alcohol/week: 4.0 standard drinks of alcohol     Types: 2 Cans of beer, 2 Drinks containing 0.5 oz of alcohol per week     Comment: social   • Drug use: Never   • Sexual activity: Yes     Partners: Female     Birth control/protection: Condom, Birth control pill       Health Maintenance   Topic Date Due   • Pneumococcal Vaccine 0-64 (1 of 2 - PCV) 2006   • ANNUAL PHYSICAL  2024   • INFLUENZA VACCINE  " 08/01/2024   • COVID-19 Vaccine (3 - 2023-24 season) 02/25/2025 (Originally 9/1/2023)   • BMI FOLLOWUP  08/27/2025   • TDAP/TD VACCINES (2 - Td or Tdap) 05/28/2029   • HEPATITIS C SCREENING  Completed       Objective     Vital Signs  /78   Pulse 95   Resp 16   Ht 182.9 cm (72.01\")   Wt 124 kg (274 lb)   SpO2 97%   BMI 37.15 kg/m²   Estimated body mass index is 37.15 kg/m² as calculated from the following:    Height as of this encounter: 182.9 cm (72.01\").    Weight as of this encounter: 124 kg (274 lb).        Physical Exam  Vitals and nursing note reviewed.   Constitutional:       Appearance: Normal appearance.   HENT:      Head: Normocephalic and atraumatic.   Cardiovascular:      Rate and Rhythm: Normal rate and regular rhythm.      Pulses: Normal pulses.      Heart sounds: Normal heart sounds.   Pulmonary:      Effort: Pulmonary effort is normal. No respiratory distress.      Breath sounds: Normal breath sounds. No wheezing, rhonchi or rales.   Abdominal:      General: Abdomen is flat. Bowel sounds are normal.      Palpations: Abdomen is soft.      Tenderness: There is no abdominal tenderness. There is no guarding.   Musculoskeletal:      Cervical back: Neck supple.      Comments: Tenderness on lumbosacral area, paraspinal muscle tightness, pain radiation to right leg   Skin:     General: Skin is warm.      Capillary Refill: Capillary refill takes less than 2 seconds.   Neurological:      General: No focal deficit present.      Mental Status: He is alert. Mental status is at baseline.   Psychiatric:         Mood and Affect: Mood normal.         Behavior: Behavior normal.          Procedures     Assessment and Plan     Diagnoses and all orders for this visit:    1. Allergy, sequela (Primary)  Assessment & Plan:  With recent anaphylaxis reaction x 3 the past 4 to 6 months will refer to allergy    Orders:  -     Ambulatory Referral to Allergy    2. Hyperglycemia  Assessment & Plan:  Repeat labs prior " to next visit    Orders:  -     Hemoglobin A1c    3. Class 2 severe obesity with serious comorbidity and body mass index (BMI) of 37.0 to 37.9 in adult, unspecified obesity type  Assessment & Plan:  Patient's (Body mass index is 37.15 kg/m².) indicates that they are obese (BMI >30) with health conditions that include none . Weight is improving with lifestyle modifications. BMI  is above average; BMI management plan is completed. We discussed portion control and increasing exercise.     Orders:  -     CBC & Differential  -     Comprehensive Metabolic Panel  -     Lipid Panel  -     Hemoglobin A1c    4. Encounter for preventive care  -     CBC & Differential  -     Comprehensive Metabolic Panel  -     Lipid Panel  -     TSH Rfx On Abnormal To Free T4    5. Anxiety  Assessment & Plan:  Stable not on any medications    Orders:  -     Comprehensive Metabolic Panel  -     TSH Rfx On Abnormal To Free T4    6. Low back pain with right-sided sciatica, unspecified back pain laterality, unspecified chronicity  Assessment & Plan:  Methocarbamol as needed  Secondary to history of protrusion of intervertebral disc in the lumbar sacral region.  Referred to physical therapy and orthopedics    Orders:  -     methocarbamol (ROBAXIN) 750 MG tablet; Take 1 tablet by mouth 3 (Three) Times a Day As Needed for Muscle Spasms.  Dispense: 30 tablet; Refill: 1  -     MRI Lumbar Spine With & Without Contrast; Future  -     Ambulatory Referral to Orthopedic Surgery  -     Ambulatory Referral to Physical Therapy for Evaluation & Treatment    7. Protrusion of intervertebral disc of lumbosacral region  Assessment & Plan:  Light work duty this week for now  Reassess after trying NSAIDs and as needed methocarbamol  If unresolved or not improving, consider prednisone or Medrol Dosepak  Referred to physical therapy and orthopedics  Obtain MRI lumbosacral spine    Orders:  -     MRI Lumbar Spine With & Without Contrast; Future  -     Ambulatory  Referral to Orthopedic Surgery  -     Ambulatory Referral to Physical Therapy for Evaluation & Treatment    8. Lumbar radiculopathy  Assessment & Plan:  Light work duty this week for now  Reassess after trying NSAIDs and as needed methocarbamol  If unresolved or not improving, consider prednisone or Medrol Dosepak  Referred to physical therapy and orthopedics  Obtain MRI lumbosacral spine    Orders:  -     MRI Lumbar Spine With & Without Contrast; Future  -     Ambulatory Referral to Orthopedic Surgery  -     Ambulatory Referral to Physical Therapy for Evaluation & Treatment         Counseling was given to patient for the following topics: instructions for management, risks and benefits of treatment options, and importance of treatment compliance.    Follow Up  Return in about 6 months (around 2/27/2025).    MD MIC [facility]  [address] [place] [id number] [phone]

## 2024-09-20 ENCOUNTER — PATIENT MESSAGE (OUTPATIENT)
Dept: INTERNAL MEDICINE | Facility: CLINIC | Age: 24
End: 2024-09-20
Payer: COMMERCIAL

## 2024-09-20 ENCOUNTER — HOSPITAL ENCOUNTER (OUTPATIENT)
Dept: MRI IMAGING | Facility: HOSPITAL | Age: 24
Discharge: HOME | End: 2024-09-20
Payer: COMMERCIAL

## 2024-09-20 DIAGNOSIS — M54.41 LOW BACK PAIN WITH RIGHT-SIDED SCIATICA, UNSPECIFIED BACK PAIN LATERALITY, UNSPECIFIED CHRONICITY: ICD-10-CM

## 2024-09-20 DIAGNOSIS — M51.27 PROTRUSION OF INTERVERTEBRAL DISC OF LUMBOSACRAL REGION: ICD-10-CM

## 2024-09-20 DIAGNOSIS — M54.16 LUMBAR RADICULOPATHY: ICD-10-CM

## 2024-09-23 DIAGNOSIS — M51.27 PROTRUSION OF INTERVERTEBRAL DISC OF LUMBOSACRAL REGION: ICD-10-CM

## 2024-09-23 DIAGNOSIS — M54.41 LOW BACK PAIN WITH RIGHT-SIDED SCIATICA, UNSPECIFIED BACK PAIN LATERALITY, UNSPECIFIED CHRONICITY: ICD-10-CM

## 2024-09-23 DIAGNOSIS — M54.16 LUMBAR RADICULOPATHY: Primary | ICD-10-CM

## 2024-10-04 ENCOUNTER — HOSPITAL ENCOUNTER (EMERGENCY)
Facility: HOSPITAL | Age: 24
Discharge: HOME | End: 2024-10-04
Attending: STUDENT IN AN ORGANIZED HEALTH CARE EDUCATION/TRAINING PROGRAM
Payer: COMMERCIAL

## 2024-10-04 VITALS
HEART RATE: 87 BPM | DIASTOLIC BLOOD PRESSURE: 69 MMHG | TEMPERATURE: 98.4 F | SYSTOLIC BLOOD PRESSURE: 116 MMHG | OXYGEN SATURATION: 95 %

## 2024-10-04 DIAGNOSIS — T78.40XA ALLERGIC REACTION, INITIAL ENCOUNTER: Primary | ICD-10-CM

## 2024-10-04 LAB
HOLD SPECIMEN: NORMAL
HOLD SPECIMEN: NORMAL
WHOLE BLOOD HOLD COAG: NORMAL
WHOLE BLOOD HOLD SPECIMEN: NORMAL

## 2024-10-04 PROCEDURE — 96374 THER/PROPH/DIAG INJ IV PUSH: CPT

## 2024-10-04 PROCEDURE — 25810000003 SODIUM CHLORIDE 0.9 % SOLUTION: Performed by: STUDENT IN AN ORGANIZED HEALTH CARE EDUCATION/TRAINING PROGRAM

## 2024-10-04 PROCEDURE — 36415 COLL VENOUS BLD VENIPUNCTURE: CPT

## 2024-10-04 PROCEDURE — 99283 EMERGENCY DEPT VISIT LOW MDM: CPT

## 2024-10-04 PROCEDURE — 93005 ELECTROCARDIOGRAM TRACING: CPT | Performed by: STUDENT IN AN ORGANIZED HEALTH CARE EDUCATION/TRAINING PROGRAM

## 2024-10-04 RX ORDER — FAMOTIDINE 10 MG/ML
20 INJECTION, SOLUTION INTRAVENOUS ONCE
Status: COMPLETED | OUTPATIENT
Start: 2024-10-04 | End: 2024-10-04

## 2024-10-04 RX ORDER — SODIUM CHLORIDE 0.9 % (FLUSH) 0.9 %
10 SYRINGE (ML) INJECTION AS NEEDED
Status: DISCONTINUED | OUTPATIENT
Start: 2024-10-04 | End: 2024-10-05 | Stop reason: HOSPADM

## 2024-10-04 RX ADMIN — FAMOTIDINE 20 MG: 10 INJECTION INTRAVENOUS at 20:57

## 2024-10-04 RX ADMIN — SODIUM CHLORIDE 500 ML: 9 INJECTION, SOLUTION INTRAVENOUS at 20:58

## 2024-10-04 NOTE — Clinical Note
[facility] EMERGENCY DEPARTMENT  [address] [address]  Phone: [phone]    Chary Boykin was seen and treated in our emergency department on 10/4/2024.  He may return to work on 10/06/2024.         Thank you for choosing [facility].    Ray Swan,

## 2024-10-05 NOTE — ED PROVIDER NOTES
[facility] EMERGENCY DEPARTMENT  Emergency Department Encounter  Emergency Medicine Physician Note       Pt Name: Chary Boykin  MRN: [id number]  Pt :   2000  Room Number:    Date of encounter:  10/4/2024  PCP: Lacey Jeff MD  ED Physician: Ray Swan,     HPI:  Chary Boykin is a 24 y.o. male who presents to the ED with chief complaint of allergic reaction. Patient is a local paramedic. Past medical history of anaphylaxis to shellfish.  Around 8:30 PM patient ate Caesar salad with severe dressing.  Shortly after developed extreme flushing, rash throughout the chest, shortness of breath and wheezing, difficulty breathing.  Duration constant.  Vital signs stable en route. Medics established IV access.  He received Benadryl 50 mg IV, Zofran 4 mg IV, and epinephrine 0.5 mg IM prior to arrival with improvement of symptoms.  Patient reports symptoms are minimal upon arrival.    PAST MEDICAL HISTORY  Past Medical History:   Diagnosis Date    Allergic 2016    Shellfish & Stings    Ankle injury, right, subsequent encounter     Hypoglycemia     Injury of back     mid to lower back injury    Low back pain 2017    Had microdiscetomy    Lumbosacral disc herniation     Obesity 2018    Pneumonia      Current Outpatient Medications   Medication Instructions    EPINEPHrine (EPIPEN) 0.3 mg, Subcutaneous, Once    methocarbamol (ROBAXIN) 750 mg, Oral, 3 Times Daily PRN      PAST SURGICAL HISTORY  Past Surgical History:   Procedure Laterality Date    ADENOIDECTOMY  2014    BACK SURGERY      micro-discectomy    CHOLECYSTECTOMY      no stones    FRACTURE SURGERY  2023    Right lower extremity    SPINE SURGERY  2018    Microdiscetomy    TONSILLECTOMY AND ADENOIDECTOMY      WISDOM TOOTH EXTRACTION         FAMILY HISTORY  Family History   Problem Relation Age of Onset    Cancer Father     Anxiety disorder Father     Arthritis Father     Depression Father     Arthritis  Mother     Asthma Mother     Colon cancer Neg Hx     Cirrhosis Neg Hx     Liver cancer Neg Hx     Liver disease Neg Hx        SOCIAL HISTORY  Social History     Socioeconomic History    Marital status: Single   Tobacco Use    Smoking status: Every Day     Current packs/day: 0.00     Average packs/day: 0.3 packs/day for 0.5 years (0.1 ttl pk-yrs)     Types: Cigarettes, Electronic Cigarette     Start date: 5/3/2022     Last attempt to quit: 2022     Years since quittin.9    Smokeless tobacco: Never    Tobacco comments:     Vape 95%, Smoke 5%   Vaping Use    Vaping status: Never Used   Substance and Sexual Activity    Alcohol use: Yes     Alcohol/week: 4.0 standard drinks of alcohol     Types: 2 Cans of beer, 2 Drinks containing 0.5 oz of alcohol per week     Comment: social    Drug use: Never    Sexual activity: Yes     Partners: Female     Birth control/protection: Condom, Birth control pill     ALLERGIES  Bee venom, Shellfish-derived products, and Wasp venom    REVIEW OF SYSTEMS  All systems reviewed and negative except for those discussed in HPI.     PHYSICAL EXAM  ED Triage Vitals   Temp Pulse Resp BP SpO2   -- -- -- -- --      Temp src Heart Rate Source Patient Position BP Location FiO2 (%)   -- -- -- -- --     I have reviewed the triage vital signs and nursing notes.    General: Alert.  Nontoxic appearance.  No acute distress.  Head: Normocephalic.  Atraumatic.  Eyes: No scleral icterus.  ENT: Airway is patent.  No angioedema present.  Moist mucous membranes.  Uvula is midline.  No erythema.  No exudate.  No submandibular edema.  Tolerating oral secretions appropriately.  Cardiovascular: Regular rate and rhythm.  No murmurs.  No rubs.  2+ distal pulses bilaterally.  Respiratory: Equal breath sounds bilaterally.  No rales.  No rhonchi.  No wheezing.  GI: Abdomen is soft.  Nondistended.  Nontender to palpation.  No rebound.  No guarding.  No CVA tenderness.  MSK: Moves all 4 extremities.  Neurologic:  Oriented x 3.  No focal deficits.  Skin: No rash.  No edema. No erythema. No pallor. No cyanosis.  Psych: Normal mood and affect.    LAB RESULTS  Recent Results (from the past 24 hour(s))   Green Top (Gel)    Collection Time: 10/04/24  8:46 PM   Result Value Ref Range    Extra Tube Hold for add-ons.    Lavender Top    Collection Time: 10/04/24  8:46 PM   Result Value Ref Range    Extra Tube hold for add-on    Gold Top - SST    Collection Time: 10/04/24  8:46 PM   Result Value Ref Range    Extra Tube Hold for add-ons.    Light Blue Top    Collection Time: 10/04/24  8:46 PM   Result Value Ref Range    Extra Tube Hold for add-ons.        RADIOLOGY  No Radiology Exams Resulted Within Past 24 Hours    PROCEDURES  Procedures    RISK STRATIFICATION    MEDICAL DECISION MAKING  24 y.o. male with past medical history listed above who presents with allergic reaction.    Patient arrives via EMS.  I have reviewed the EMS documentation/notes and included that information in my HPI.    Vital signs within normal limits.    Clinical presentation and physical exam consistent with allergic reaction.  No clinical evidence of anaphylaxis, angioedema, airway compromise upon arrival.    EKG ordered in triage. No clinical indication for labs or imaging.    Patient received IV Benadryl and IM epinephrine prior to arrival.  Will add IV Pepcid and IV fluids for symptoms.    Patient was placed on cardiac monitor and pulse oximetry for extended ED observation given he received IM epinephrine prior to arrival.    Please see ED course below for my interpretation of the ED workup.  ED Course as of 10/04/24 2243   Fri Oct 04, 2024   2109 ECG 12 Lead Dyspnea  EKG per my interpretation normal sinus rhythm, rate 94, normal axis, no ST segment elevation or depression, normal QRS QTC intervals.   [JS]      ED Course User Index  [JS] Ray Swan DO     Medications administered in ED:  Medications   sodium chloride 0.9 % flush 10 mL (has no  administration in time range)   famotidine (PEPCID) injection 20 mg (20 mg Intravenous Given 10/4/24 2057)   sodium chloride 0.9 % bolus 500 mL (0 mL Intravenous Stopped 10/4/24 2128)     Patient observed in the ED for extended period of time.  On re-evaluation, patient resting comfortably.  States symptoms have improved following therapy. Vital signs remained stable on room air.  Patient was ambulatory in the ED with steady gait.  Able to tolerate oral intake appropriately.    I discussed the findings of the ED workup with the patient at bedside.  No clinical indication for admission.  I recommended outpatient follow-up with PCP.  Patient was deemed medically stable for discharge with close outpatient follow-up and strict ED return precautions. Patient agreeable with plan and disposition.    Home medications were reviewed.  Patient already has EpiPen at home.    Chronic conditions affecting care: None    Social determinants of health impacting treatment or disposition: None    REPEAT VITAL SIGNS  AS OF 22:43 EDT VITALS:  BP - 116/69  HR - 87  TEMP - 98.4 °F (36.9 °C) (Oral)  O2 SATS - 95%    DIAGNOSIS  Final diagnoses:   Allergic reaction, initial encounter     DISPOSITION  ED Disposition       ED Disposition   Discharge    Condition   Stable    Comment   --               PATIENT REFERRALS  Lacey Jeff MD  [address] [place] [address] [phone]      PCP.  48 hours.            Please note that portions of this document were completed with voice recognition software.        Ray Swan DO  10/04/24 8264

## 2025-07-09 ENCOUNTER — OFFICE VISIT (OUTPATIENT)
Dept: INTERNAL MEDICINE | Facility: CLINIC | Age: 25
End: 2025-07-09
Payer: COMMERCIAL

## 2025-07-09 VITALS
OXYGEN SATURATION: 96 % | SYSTOLIC BLOOD PRESSURE: 131 MMHG | HEIGHT: 72 IN | WEIGHT: 282 LBS | TEMPERATURE: 98.4 F | RESPIRATION RATE: 18 BRPM | DIASTOLIC BLOOD PRESSURE: 76 MMHG | BODY MASS INDEX: 38.19 KG/M2 | HEART RATE: 92 BPM

## 2025-07-09 DIAGNOSIS — S30.861A TICK BITE OF ABDOMEN, INITIAL ENCOUNTER: Primary | ICD-10-CM
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
## 2025-07-09 DIAGNOSIS — L03.311 CELLULITIS, ABDOMINAL WALL: ICD-10-CM

## 2025-07-09 PROCEDURE — 99214 OFFICE O/P EST MOD 30 MIN: CPT | Performed by: STUDENT IN AN ORGANIZED HEALTH CARE EDUCATION/TRAINING PROGRAM

## 2025-07-09 RX ORDER — DOXYCYCLINE 100 MG/1
100 CAPSULE ORAL 2 TIMES DAILY
Qty: 20 CAPSULE | Refills: 0 | Status: SHIPPED | OUTPATIENT
Start: 2025-07-09 | End: 2025-07-19

## 2025-07-09 RX ORDER — MUPIROCIN 2 %
1 OINTMENT (GRAM) TOPICAL 3 TIMES DAILY
Qty: 30 G | Refills: 0 | Status: SHIPPED | OUTPATIENT
Start: 2025-07-09

## 2025-07-09 NOTE — PROGRESS NOTES
Office Note     Name: Chary Boykin    : 2000     MRN: [id number]     Chief Complaint  Cyst (BOIL TYPE SPOT ON ABDOMEN, LEAKING AND WARM TO TOUCH. LOOKING INFECTED)    Subjective     History of Present Illness:  Chary Boykin is a 25 y.o. male who presents today for rash on left side of the mid.  [place]  Did not notice any tick bite or any arthropod bite.  Then he noticed 2 days ago left side of his abdomen with a boil, noted to be red eventually having discharge from a central open lesion.  Since then, redness has spread out.  No fever, body aches, nausea or diarrhea.      Family History:   Family History   Problem Relation Age of Onset    Cancer Father     Anxiety disorder Father     Arthritis Father     Depression Father     Arthritis Mother     Asthma Mother     Colon cancer Neg Hx     Cirrhosis Neg Hx     Liver cancer Neg Hx     Liver disease Neg Hx        Social History:   Social History     Socioeconomic History    Marital status: Single   Tobacco Use    Smoking status: Every Day     Current packs/day: 0.00     Average packs/day: 0.3 packs/day for 0.5 years (0.1 ttl pk-yrs)     Types: Cigarettes, Electronic Cigarette     Start date: 5/3/2022     Last attempt to quit: 2022     Years since quittin.6    Smokeless tobacco: Never    Tobacco comments:     Vape 95%, Smoke 5%   Vaping Use    Vaping status: Never Used   Substance and Sexual Activity    Alcohol use: Yes     Alcohol/week: 4.0 standard drinks of alcohol     Types: 2 Cans of beer, 2 Drinks containing 0.5 oz of alcohol per week     Comment: social    Drug use: Never    Sexual activity: Yes     Partners: Female     Birth control/protection: Condom, Birth control pill       Health Maintenance   Topic Date Due    Pneumococcal Vaccine 0-49 (1 of 2 - PCV) 2019    COVID-19 Vaccine (3 - 2024- season) 2026 (Originally 2024)    ANNUAL PHYSICAL  2025    INFLUENZA VACCINE   "10/01/2025    TDAP/TD VACCINES (3 - Td or Tdap) 07/07/2035    HEPATITIS C SCREENING  Completed       Patient Care Team:  Lacey Jeff MD as PCP - General (Family Medicine)  Dominic Lucas MD as Consulting Physician (General Surgery)    Objective     Vital Signs  /76   Pulse 92   Temp 98.4 °F (36.9 °C) (Infrared)   Resp 18   Ht 182.9 cm (72.01\")   Wt 128 kg (282 lb)   SpO2 96%   BMI 38.24 kg/m²   Estimated body mass index is 38.24 kg/m² as calculated from the following:    Height as of this encounter: 182.9 cm (72.01\").    Weight as of this encounter: 128 kg (282 lb).        Physical Exam  Skin:     Comments: Single erythematous plaque, more erythematous at the center with central punctum (purple to red in color).  Tenderness, no discharge, no abscess          Procedures     Assessment and Plan     Diagnoses and all orders for this visit:    1. Tick bite of abdomen, initial encounter (Primary)  -     Lyme Disease Total Antibody With Reflex to Immunoassay; Future  -     doxycycline (VIBRAMYCIN) 100 MG capsule; Take 1 capsule by mouth 2 (Two) Times a Day for 10 days.  Dispense: 20 capsule; Refill: 0    2. Cellulitis, abdominal wall  -     mupirocin (BACTROBAN) 2 % ointment; Apply 1 Application topically to the appropriate area as directed 3 (Three) Times a Day.  Dispense: 30 g; Refill: 0  -     doxycycline (VIBRAMYCIN) 100 MG capsule; Take 1 capsule by mouth 2 (Two) Times a Day for 10 days.  Dispense: 20 capsule; Refill: 0    Erythema migrans is considered  - Empirically treat Lyme disease versus cellulitis with doxycycline as prescribed  - Start mupirocin for 7 days    Counseling was given to patient for the following topics: instructions for management, impressions, and risks and benefits of treatment options.    Follow Up  No follow-ups on file.    MD [facility]  [address]  [facility] [place] [facility] [place] " [id number] [phone]

## 2025-07-10 LAB
ALBUMIN SERPL-MCNC: 4.4 G/DL (ref 3.5–5.2)
ALBUMIN/GLOB SERPL: 1.5 G/DL
ALP SERPL-CCNC: 81 U/L (ref 39–117)
ALT SERPL-CCNC: 30 U/L (ref 1–41)
AST SERPL-CCNC: 18 U/L (ref 1–40)
BASOPHILS # BLD AUTO: 0.07 10*3/MM3 (ref 0–0.2)
BASOPHILS NFR BLD AUTO: 1.1 % (ref 0–1.5)
BILIRUB SERPL-MCNC: 0.4 MG/DL (ref 0–1.2)
BUN SERPL-MCNC: 11 MG/DL (ref 6–20)
BUN/CREAT SERPL: 11 (ref 7–25)
CALCIUM SERPL-MCNC: 9.5 MG/DL (ref 8.6–10.5)
CHLORIDE SERPL-SCNC: 105 MMOL/L (ref 98–107)
CHOLEST SERPL-MCNC: 115 MG/DL (ref 0–200)
CO2 SERPL-SCNC: 23.3 MMOL/L (ref 22–29)
CREAT SERPL-MCNC: 1 MG/DL (ref 0.76–1.27)
EGFRCR SERPLBLD CKD-EPI 2021: 107.1 ML/MIN/1.73
EOSINOPHIL # BLD AUTO: 0.19 10*3/MM3 (ref 0–0.4)
EOSINOPHIL NFR BLD AUTO: 3 % (ref 0.3–6.2)
ERYTHROCYTE [DISTWIDTH] IN BLOOD BY AUTOMATED COUNT: 12.8 % (ref 12.3–15.4)
GLOBULIN SER CALC-MCNC: 3 GM/DL
GLUCOSE SERPL-MCNC: 82 MG/DL (ref 65–99)
HBA1C MFR BLD: 5.3 % (ref 4.8–5.6)
HCT VFR BLD AUTO: 41.4 % (ref 37.5–51)
HDLC SERPL-MCNC: 38 MG/DL (ref 40–60)
HGB BLD-MCNC: 14.4 G/DL (ref 13–17.7)
IMM GRANULOCYTES # BLD AUTO: 0.01 10*3/MM3 (ref 0–0.05)
IMM GRANULOCYTES NFR BLD AUTO: 0.2 % (ref 0–0.5)
LDLC SERPL CALC-MCNC: 58 MG/DL (ref 0–100)
LYMPHOCYTES # BLD AUTO: 2 10*3/MM3 (ref 0.7–3.1)
LYMPHOCYTES NFR BLD AUTO: 31.7 % (ref 19.6–45.3)
MCH RBC QN AUTO: 31.4 PG (ref 26.6–33)
MCHC RBC AUTO-ENTMCNC: 34.8 G/DL (ref 31.5–35.7)
MCV RBC AUTO: 90.4 FL (ref 79–97)
MONOCYTES # BLD AUTO: 1.02 10*3/MM3 (ref 0.1–0.9)
MONOCYTES NFR BLD AUTO: 16.2 % (ref 5–12)
NEUTROPHILS # BLD AUTO: 3.01 10*3/MM3 (ref 1.7–7)
NEUTROPHILS NFR BLD AUTO: 47.8 % (ref 42.7–76)
NRBC BLD AUTO-RTO: 0 /100 WBC (ref 0–0.2)
PLATELET # BLD AUTO: 226 10*3/MM3 (ref 140–450)
POTASSIUM SERPL-SCNC: 3.9 MMOL/L (ref 3.5–5.2)
PROT SERPL-MCNC: 7.4 G/DL (ref 6–8.5)
RBC # BLD AUTO: 4.58 10*6/MM3 (ref 4.14–5.8)
SODIUM SERPL-SCNC: 141 MMOL/L (ref 136–145)
TRIGL SERPL-MCNC: 103 MG/DL (ref 0–150)
TSH SERPL DL<=0.005 MIU/L-ACNC: 3.45 UIU/ML (ref 0.27–4.2)
VLDLC SERPL CALC-MCNC: 19 MG/DL (ref 5–40)
WBC # BLD AUTO: 6.3 10*3/MM3 (ref 3.4–10.8)

## 2025-07-11 LAB — B BURGDOR IGG+IGM SER QL IA: NEGATIVE
